# Patient Record
Sex: FEMALE | Race: WHITE | Employment: FULL TIME | URBAN - METROPOLITAN AREA
[De-identification: names, ages, dates, MRNs, and addresses within clinical notes are randomized per-mention and may not be internally consistent; named-entity substitution may affect disease eponyms.]

---

## 2019-04-02 ENCOUNTER — HOSPITAL ENCOUNTER (EMERGENCY)
Age: 49
Discharge: HOME | End: 2019-04-02

## 2019-04-02 VITALS
BODY MASS INDEX: 28.52 KG/M2 | WEIGHT: 155 LBS | HEART RATE: 85 BPM | TEMPERATURE: 97.5 F | DIASTOLIC BLOOD PRESSURE: 97 MMHG | RESPIRATION RATE: 22 BRPM | OXYGEN SATURATION: 100 % | SYSTOLIC BLOOD PRESSURE: 167 MMHG | HEIGHT: 62 IN

## 2019-04-02 DIAGNOSIS — K08.89 DENTALGIA: ICD-10-CM

## 2019-04-02 DIAGNOSIS — K02.9 DENTAL CARIES: Primary | ICD-10-CM

## 2019-04-02 DIAGNOSIS — K02.9 PAIN DUE TO DENTAL CARIES: ICD-10-CM

## 2019-04-02 PROCEDURE — 99283 EMERGENCY DEPT VISIT LOW MDM: CPT

## 2019-04-02 PROCEDURE — 6370000000 HC RX 637 (ALT 250 FOR IP): Performed by: PHYSICIAN ASSISTANT

## 2019-04-02 RX ORDER — IBUPROFEN 800 MG/1
800 TABLET ORAL EVERY 8 HOURS PRN
Qty: 20 TABLET | Refills: 0 | Status: SHIPPED | OUTPATIENT
Start: 2019-04-02 | End: 2019-04-04

## 2019-04-02 RX ORDER — PENICILLIN V POTASSIUM 250 MG/1
500 TABLET ORAL ONCE
Status: COMPLETED | OUTPATIENT
Start: 2019-04-02 | End: 2019-04-02

## 2019-04-02 RX ORDER — IBUPROFEN 800 MG/1
800 TABLET ORAL ONCE
Status: COMPLETED | OUTPATIENT
Start: 2019-04-02 | End: 2019-04-02

## 2019-04-02 RX ORDER — ACETAMINOPHEN 325 MG/1
650 TABLET ORAL EVERY 6 HOURS PRN
COMMUNITY

## 2019-04-02 RX ORDER — PENICILLIN V POTASSIUM 500 MG/1
500 TABLET ORAL 4 TIMES DAILY
Qty: 28 TABLET | Refills: 0 | Status: SHIPPED | OUTPATIENT
Start: 2019-04-02 | End: 2019-04-04

## 2019-04-02 RX ADMIN — IBUPROFEN 800 MG: 800 TABLET, FILM COATED ORAL at 13:44

## 2019-04-02 RX ADMIN — PENICILLIN V POTASSIUM 500 MG: 250 TABLET ORAL at 13:44

## 2019-04-02 ASSESSMENT — PAIN DESCRIPTION - LOCATION: LOCATION: TEETH

## 2019-04-02 ASSESSMENT — PAIN SCALES - GENERAL: PAINLEVEL_OUTOF10: 10

## 2019-04-02 NOTE — ED PROVIDER NOTES
Department of Emergency Medicine   ED  Provider Note  Admit Date/RoomTime: 4/2/2019  1:20 PM  ED Room: T1-3/   Chief Complaint   Dental Pain (left side bottom teeth pain for one week)    History of Present Illness   Source of history provided by:  patient. History/Exam Limitations: none. Maryellen Rosario is a 50 y.o. old female who has a past medical history of: History reviewed. No pertinent past medical history. presents to the emergency department by private vehicle, for left lower tooth and jaw pain, which occured 7 day(s) prior to arrival, worse today. Since onset the symptoms have been constant and gradually worsening and moderate to severe in severity. Worsened by  chewing and improved by nothing, heat and ice. Associated Signs & Symptoms:  no other symptoms and Facial pain. Onset:       Spontaneous:   yes. Following Trauma:   no.     Previous Caries:   yes. Recent Dental Procedure:   no.     ROS    Pertinent positives and negatives are stated within HPI, all other systems reviewed and are negative. .    Past Surgical History:  has a past surgical history that includes Tonsillectomy; Ankle surgery; and Tubal ligation. Social History:  reports that she quit smoking about 13 months ago. She has never used smokeless tobacco. She reports that she does not drink alcohol or use drugs. Family History: family history is not on file. Allergies: Aspirin; Bee venom; and Lactose intolerance (gi)    Physical Exam           ED Triage Vitals   BP Temp Temp Source Pulse Resp SpO2 Height Weight   04/02/19 1317 04/02/19 1316 04/02/19 1316 04/02/19 1317 04/02/19 1316 04/02/19 1318 04/02/19 1316 04/02/19 1316   (!) 167/97 97.5 °F (36.4 °C) Oral 85 22 100 % 5' 2\" (1.575 m) 155 lb (70.3 kg)      Oxygen Saturation Interpretation: Normal.    · Constitutional:  Alert, development consistent with age. · HEENT:  NC/NT. Airway patent. · Neck:  Supple. Normal ROM.   · Lips:  upper and lower worsening symptoms arise they should immediately return to the emergency room. Follow-up with primary care in 1-2 days. Assessment      1. Dental caries    2. Dentalgia    3. Pain due to dental caries      Plan   Discharge to home  Patient condition is stable    New Medications     New Prescriptions    IBUPROFEN (ADVIL;MOTRIN) 800 MG TABLET    Take 1 tablet by mouth every 8 hours as needed for Pain    MAGIC MOUTHWASH (MIRACLE MOUTHWASH)    Swish and spit 10 mLs 4 times daily as needed for Irritation Dispense as Magic Mouthwash. Please add Equal Parts: 60 mL Maalox, 60 mL Viscous Lidocaine, 60 mL Benadryl to 60mL of Carafate. 10 ml swish and spit or swallow as directed above. PENICILLIN V POTASSIUM (VEETID) 500 MG TABLET    Take 1 tablet by mouth 4 times daily for 7 days     [facility] signed by [facility]   DD: 4/2/19  **This report was transcribed using voice recognition software. Every effort was made to ensure accuracy; however, inadvertent computerized transcription errors may be present.   END OF ED PROVIDER NOTE      [facility]  04/02/19 8713

## 2019-04-04 ENCOUNTER — HOSPITAL ENCOUNTER (EMERGENCY)
Age: 49
Discharge: HOME | End: 2019-04-04

## 2019-04-04 VITALS
TEMPERATURE: 97.7 F | OXYGEN SATURATION: 99 % | RESPIRATION RATE: 18 BRPM | SYSTOLIC BLOOD PRESSURE: 156 MMHG | DIASTOLIC BLOOD PRESSURE: 99 MMHG | WEIGHT: 155 LBS | HEART RATE: 85 BPM | BODY MASS INDEX: 28.52 KG/M2 | HEIGHT: 62 IN

## 2019-04-04 DIAGNOSIS — K04.7 DENTAL ABSCESS: Primary | ICD-10-CM

## 2019-04-04 PROCEDURE — 4500000023 HC ED LEVEL 3 PROCEDURE

## 2019-04-04 PROCEDURE — 6370000000 HC RX 637 (ALT 250 FOR IP): Performed by: PHYSICIAN ASSISTANT

## 2019-04-04 PROCEDURE — 99283 EMERGENCY DEPT VISIT LOW MDM: CPT

## 2019-04-04 RX ORDER — CLINDAMYCIN HYDROCHLORIDE 150 MG/1
450 CAPSULE ORAL ONCE
Status: COMPLETED | OUTPATIENT
Start: 2019-04-04 | End: 2019-04-04

## 2019-04-04 RX ORDER — CLINDAMYCIN HYDROCHLORIDE 150 MG/1
450 CAPSULE ORAL 3 TIMES DAILY
Qty: 90 CAPSULE | Refills: 0 | Status: SHIPPED | OUTPATIENT
Start: 2019-04-04 | End: 2019-04-14

## 2019-04-04 RX ADMIN — LIDOCAINE HYDROCHLORIDE 15 ML: 20 SOLUTION ORAL; TOPICAL at 13:53

## 2019-04-04 RX ADMIN — CLINDAMYCIN HYDROCHLORIDE 450 MG: 150 CAPSULE ORAL at 13:52

## 2019-04-04 ASSESSMENT — PAIN DESCRIPTION - LOCATION: LOCATION: MOUTH

## 2019-04-04 ASSESSMENT — PAIN SCALES - GENERAL: PAINLEVEL_OUTOF10: 10

## 2019-04-04 ASSESSMENT — PAIN DESCRIPTION - ORIENTATION: ORIENTATION: LEFT

## 2019-04-04 ASSESSMENT — PAIN DESCRIPTION - PAIN TYPE: TYPE: ACUTE PAIN

## 2019-04-04 NOTE — ED PROVIDER NOTES
Department of Emergency Medicine   ED  Provider Note  Admit Date/RoomTime: 4/4/2019 12:51 PM  ED Room: 14/14   Chief Complaint   Oral Swelling (Was seen in ER 2 days ago and treated for abscessed tooth. Prescribed PCN and ibuprofen. States that the swelling is 2x the size that it was 2 days ago and pain is getting worse. )    History of Present Illness   Source of history provided by:  patient. History/Exam Limitations: none. Sarabjit Hutson is a 50 y.o. old female who has a past medical history of: History reviewed. No pertinent past medical history. presents to the emergency department by private vehicle, for left lower jaw pain, which occured 5 day(s) prior to arrival.  Since onset the symptoms have been constant and gradually worsening and moderate to severe in severity. Worsened by  chewing and improved by nothing. Associated Signs & Symptoms:  no other symptoms and Facial pain. Onset:       Spontaneous:   yes. Following Trauma:   no.     Previous Caries:   yes. Recent Dental Procedure:   no.     ROS    Pertinent positives and negatives are stated within HPI, all other systems reviewed and are negative. .    Past Surgical History:  has a past surgical history that includes Tonsillectomy; Ankle surgery; and Tubal ligation. Social History:  reports that she quit smoking about 14 months ago. She has never used smokeless tobacco. She reports that she does not drink alcohol or use drugs. Family History: family history is not on file. Allergies: Aspirin; Bee venom; and Lactose intolerance (gi)    Physical Exam           ED Triage Vitals [04/04/19 1252]   BP Temp Temp Source Pulse Resp SpO2 Height Weight   (!) 145/84 97.7 °F (36.5 °C) Oral 86 17 100 % 5' 2\" (1.575 m) 155 lb (70.3 kg)      Oxygen Saturation Interpretation: Normal.    · Constitutional:  Alert, development consistent with age. · HEENT:  NC/NT. Airway patent. · Neck:  Supple. Normal ROM.   · Lips:  upper and lower normal.  · Mouth:  normal tongue and buccal mucosa. · Dental:  Cavity and abscess formation to tooth #20                    Trismus: No.         Drooling: No.           Airway stridor: No.  · Facial skin: left warmth, tenderness and swelling. · Respiratory:  Clear to auscultation and breath sounds equal.    · CV: Regular rate and rhythm, normal heart sounds, without pathological murmurs, ectopy, gallops, or rubs. · Skin:  No rashes, erythema or lesions present, unless noted elsewhere. .  · Lymphatics: No lymphangitis or adenopathy noted. · Neurological:  Oriented. Motor functions intact. Lab / Imaging Results   (All laboratory and radiology results have been personally reviewed by myself)  Labs:  No results found for this visit on 04/04/19. Imaging: All Radiology results interpreted by Radiologist unless otherwise noted. No orders to display     ED Course / Medical Decision Making     Medications   clindamycin (CLEOCIN) capsule 450 mg (450 mg Oral Given 4/4/19 1352)   lidocaine viscous (XYLOCAINE) 2 % solution 15 mL (15 mLs Mouth/Throat Given 4/4/19 1353)          Procedure(s):   PROCEDURE:  DENTAL BLOCK  Mey Mitchell or their surrogate had an opportunity to ask questions, and the risks, benefits, and alternatives were discussed. The injection site was prepped to maintain a sterile field. A local anesthetic was used to completely anesthetize the inferior alveolar nerve. There were no complications during the procedure. [address] [place] [address] had excellent pain relief. PROCEDURE:  INCISION & DRAINAGE  Mey Mitchell or their surrogate had an opportunity to ask questions, and the risks, benefits, and alternatives were discussed. The abscess was prepped and draped to maintain a sterile field. A local anesthetic was used to completely anesthetize the abscess. A simple stab incision was made to keep the abscess open so it will continue to drain.  It was copiously irrigated with its loculations broken down. There were no complications during the procedure. Counseling/MDM:   Febrile stable patient presents to the ED for evaluation was seen 2 days prior to arrival and placed on penicillin. Has had worsening swelling and abscess formation 2 areas cavity. No difficulty with swallowing low suspicion for 1 week's no elevation of the floor the mouth. Patient indicates she's been compliant with taking penicillin however the pain and swelling is worse. Discussed a dental block and incision and drainage patient is agreeable she is consented and has excellent pain relief with a dental block and then simple stab incision produces copious amounts of purulent drainage on reevaluation patient is feeling markedly improved she is advised we will switch her antibiotic to clindamycin she is advised to stop taking penicillin and to begin taking clindamycin importance of follow-up with the dentist is emphasized patient does verbalize understanding she'll be discharged in stable condition. The emergency provider has spoken with the patient and discussed todays results, in addition to providing specific details for the plan of care and counseling regarding the diagnosis and prognosis. Questions are answered at this time and they are agreeable with the plan. I estimate there is LOW risk for a ANAPHYLAXIS, DEEP SPACE INFECTION (e.g., GORDYS ANGINA OR RETROPHARYNGEAL ABSCESS), EPIGLOTTITIS, MENINGITIS, or AIRWAY COMPROMISE, thus I consider the discharge disposition reasonable. Also, there is no evidence or peritonitis, sepsis, or toxicity. [address] Tah[address] Malone and I have discussed the diagnosis and risks, and we agree with discharging home to follow-up with their primary doctor. We also discussed returning to the Emergency Department immediately if new or worsening symptoms occur.  We have discussed the symptoms which are most concerning (e.g., changing or worsening pain, trouble swallowing or breathing, neck stiffness or fever) that necessitate immediate return. Assessment      1. Dental abscess      Plan   Discharge to home  Patient condition is stable    New Medications     New Prescriptions    CLINDAMYCIN (CLEOCIN) 150 MG CAPSULE    Take 3 capsules by mouth 3 times daily for 10 days     Electronically signed by Morena Brennan PA-C   DD: 4/4/19  **This report was transcribed using voice recognition software. Every effort was made to ensure accuracy; however, inadvertent computerized transcription errors may be present.   END OF ED PROVIDER NOTE      Morena Brennan PA-C  04/04/19 [id number]

## 2020-07-02 ENCOUNTER — TELEPHONE (OUTPATIENT)
Dept: INTERNAL MEDICINE CLINIC | Age: 50
End: 2020-07-02

## 2020-07-03 ENCOUNTER — APPOINTMENT (OUTPATIENT)
Dept: CT IMAGING | Age: 50
End: 2020-07-03
Payer: MEDICAID

## 2020-07-03 ENCOUNTER — HOSPITAL ENCOUNTER (EMERGENCY)
Age: 50
Discharge: HOME | End: 2020-07-03
Payer: MEDICAID

## 2020-07-03 VITALS
BODY MASS INDEX: 27.6 KG/M2 | HEART RATE: 81 BPM | RESPIRATION RATE: 16 BRPM | SYSTOLIC BLOOD PRESSURE: 132 MMHG | DIASTOLIC BLOOD PRESSURE: 87 MMHG | HEIGHT: 62 IN | TEMPERATURE: 98.7 F | WEIGHT: 150 LBS | OXYGEN SATURATION: 97 %

## 2020-07-03 LAB
HCG(URINE) PREGNANCY TEST: NEGATIVE
S PYO AG THROAT QL: NEGATIVE

## 2020-07-03 PROCEDURE — 6370000000 HC RX 637 (ALT 250 FOR IP): Performed by: NURSE PRACTITIONER

## 2020-07-03 PROCEDURE — 87081 CULTURE SCREEN ONLY: CPT

## 2020-07-03 PROCEDURE — 99283 EMERGENCY DEPT VISIT LOW MDM: CPT

## 2020-07-03 PROCEDURE — 84703 CHORIONIC GONADOTROPIN ASSAY: CPT

## 2020-07-03 PROCEDURE — 87880 STREP A ASSAY W/OPTIC: CPT

## 2020-07-03 PROCEDURE — 70490 CT SOFT TISSUE NECK W/O DYE: CPT

## 2020-07-03 RX ORDER — TRAMADOL HYDROCHLORIDE 50 MG/1
50 TABLET ORAL ONCE
Status: COMPLETED | OUTPATIENT
Start: 2020-07-03 | End: 2020-07-03

## 2020-07-03 RX ORDER — TRAMADOL HYDROCHLORIDE 50 MG/1
50 TABLET ORAL EVERY 6 HOURS PRN
Qty: 12 TABLET | Refills: 0 | Status: SHIPPED | OUTPATIENT
Start: 2020-07-03 | End: 2020-07-06

## 2020-07-03 RX ADMIN — TRAMADOL HYDROCHLORIDE 50 MG: 50 TABLET, FILM COATED ORAL at 12:56

## 2020-07-03 ASSESSMENT — PAIN SCALES - GENERAL
PAINLEVEL_OUTOF10: 8
PAINLEVEL_OUTOF10: 3
PAINLEVEL_OUTOF10: 8

## 2020-07-03 NOTE — ED PROVIDER NOTES
CHIEF COMPLAINT  Otalgia (right ear x1 month causing pain to shoot up head causing it to ache)      HISTORY OF PRESENT ILLNESS  Gerhardt Silvers is a 52 y.o. female who presents to the ED complaining of sore throat and otalgia. Patient is nontoxic in appearance and in no acute distress. Patient presents to the emergency department via private vehicle. Patient reports that she has been experiencing symptoms for the past 1 month or so, states that she primarily has discomfort in her throat which then radiates into her right ear. Patient states that at times she also experiences a headache associated with this although she does deny this at this time. Reports 8 out of 10 aching and throbbing discomfort. Denies exacerbating factors. Reports does take a Tylenol, which she states does help alleviate her discomfort mildly. Denies any associated visual disturbances, lightheadedness or dizziness. No chest pain or shortness of breath or difficulty breathing. No abdominal pain, nausea or vomiting or bowel bladder complaints. She denies any associated fever or chills. No injury or trauma reported. No other complaints, modifying factors or associated symptoms. Nursing notes reviewed. History reviewed. No pertinent past medical history. Past Surgical History:   Procedure Laterality Date    ANKLE SURGERY      TONSILLECTOMY      TUBAL LIGATION       History reviewed. No pertinent family history.   Social History     Socioeconomic History    Marital status:      Spouse name: Not on file    Number of children: Not on file    Years of education: Not on file    Highest education level: Not on file   Occupational History    Not on file   Social Needs    Financial resource strain: Not on file    Food insecurity     Worry: Not on file     Inability: Not on file    Transportation needs     Medical: Not on file     Non-medical: Not on file   Tobacco Use    Smoking status: Former Smoker Last attempt to quit: 2018     Years since quittin.4    Smokeless tobacco: Never Used    Tobacco comment: 2018   Substance and Sexual Activity    Alcohol use: Yes     Comment: rarely    Drug use: No    Sexual activity: Not on file   Lifestyle    Physical activity     Days per week: Not on file     Minutes per session: Not on file    Stress: Not on file   Relationships    Social connections     Talks on phone: Not on file     Gets together: Not on file     Attends Mandaen service: Not on file     Active member of club or organization: Not on file     Attends meetings of clubs or organizations: Not on file     Relationship status: Not on file    Intimate partner violence     Fear of current or ex partner: Not on file     Emotionally abused: Not on file     Physically abused: Not on file     Forced sexual activity: Not on file   Other Topics Concern    Not on file   Social History Narrative    Not on file     No current facility-administered medications for this encounter. Current Outpatient Medications   Medication Sig Dispense Refill    traMADol (ULTRAM) 50 MG tablet Take 1 tablet by mouth every 6 hours as needed for Pain for up to 3 days. 12 tablet 0    acetaminophen (TYLENOL) 325 MG tablet Take 650 mg by mouth every 6 hours as needed for Pain      Magic Mouthwash (MIRACLE MOUTHWASH) Swish and spit 10 mLs 4 times daily as needed for Irritation Dispense as Magic Mouthwash. Please add Equal Parts: 60 mL Maalox, 60 mL Viscous Lidocaine, 60 mL Benadryl to 60mL of Carafate. 10 ml swish and spit or swallow as directed above.  240 mL 0     Allergies   Allergen Reactions    Aspirin Swelling    Bee Venom Swelling    Lactose Intolerance (Gi)        REVIEW OF SYSTEMS  10 systems reviewed, pertinent positives per HPI otherwise noted to be negative    PHYSICAL EXAM  /87   Pulse 81   Temp 98.7 °F (37.1 °C) (Oral)   Resp 16   Ht 5' 2\" (1.575 m)   Wt 150 lb (68 kg)   LMP 2020 SpO2 97%   BMI 27.44 kg/m²   GENERAL APPEARANCE: Awake and alert. Cooperative. No acute distress. HEAD: Normocephalic. Atraumatic. EYES: EOM's grossly intact. Bilateral conjunctiva clear and without exudate. No conjunctival injection bilaterally. No scleral icterus. ENT: Mucous membranes are moist. Bilateral tympanic membranes are clear. Posterior oropharynx is with mild erythema and without exudate. Uvula is midline. No tenderness with palpation to the mastoid or tragus. No muffled speech. NECK: Supple. Normal ROM. Trachea is midline. Patient notable for bilateral anterior cervical adenopathy, right slightly greater than left. She does exhibit more tenderness with palpation to the right. No meningismus. CHEST: Equal and symmetric chest rise and fall. HEART: Regular rate and rhythm without murmurs. LUNGS: Respirations unlabored. Speaking comfortably in full sentences. ABDOMEN: Nondistended. EXTREMITIES: Moves all extremities equally and neurovascularly intact. No edema. SKIN: Pink ,warm and dry. No acute rashes. NEUROLOGICAL: Alert and oriented x 4. Speech clear. No gross facial drooping. Strength is 5/5 in all extremities and sensation intact. Gait normal.   PSYCHIATRIC: Normal mood and affect. RADIOLOGY  Ct Soft Tissue Neck Wo Contrast    Result Date: 7/3/2020  EXAMINATION: CT OF THE NECK WITHOUT CONTRAST  7/3/2020 TECHNIQUE: CT of the neck was performed without the administration of intravenous contrast. Multiplanar reformatted images are provided for review. Dose modulation, iterative reconstruction, and/or weight based adjustment of the mA/kV was utilized to reduce the radiation dose to as low as reasonably achievable. COMPARISON: None.  HISTORY: ORDERING SYSTEM PROVIDED HISTORY: sore throat right > left TECHNOLOGIST PROVIDED HISTORY: Reason for exam:->sore throat right > left Is the patient pregnant?->No Reason for Exam: sore throat right > left  x1 month Acuity: Acute Type of up with ENT in 2-3 days for further evaluation/treatment. Patient will be discharged home with a prescription for the following medications below. Patient verbalizes understanding, all questions were answered and she is agreeable with the plan of care. Patient will return to ED for any new/worsening symptoms. Patient was given scripts for the following medications. I counseled patient how to take these medications. Discharge Medication List as of 7/3/2020  2:07 PM      START taking these medications    Details   traMADol (ULTRAM) 50 MG tablet Take 1 tablet by mouth every 6 hours as needed for Pain for up to 3 days. , Disp-12 tablet, R-0Print           MDM  Results for orders placed or performed during the hospital encounter of 07/03/20   Strep Screen Group A Throat   Result Value Ref Range    Rapid Strep A Screen Negative Negative   Pregnancy, Urine   Result Value Ref Range    HCG(Urine) Pregnancy Test Negative Detects HCG level >20 MIU/mL         I estimate there is LOW risk for a ANAPHYLAXIS, DEEP SPACE INFECTION (e.g., GORDYS ANGINA OR RETROPHARYNGEAL ABSCESS), EPIGLOTTITIS, MENINGITIS, or AIRWAY COMPROMISE, thus I consider the discharge disposition reasonable. Also, there is no evidence or peritonitis, sepsis, or toxicity. [address] Tahir Malone and I have discussed the diagnosis and risks, and we agree with discharging home to follow-up with their primary doctor. We also discussed returning to the Emergency Department immediately if new or worsening symptoms occur. We have discussed the symptoms which are most concerning (e.g., changing or worsening pain, trouble swallowing or breathing, neck stiffness or fever) that necessitate immediate return. Final Impression    1. Otalgia of right ear    2. Acute pharyngitis, unspecified etiology        Discharge Vital Signs:  Blood pressure 132/87, pulse 81, temperature 98.7 °F (37.1 °C), temperature source Oral, resp.  rate 16, height 5' 2\" (1.575 m), weight 150 lb (68 kg), last menstrual period 06/17/2020, SpO2 97 %, not currently breastfeeding. DISPOSITION  Patient was discharged to home in stable condition. DISCLAIMER:  Please note this report has been produced using speech recognition Dragon software and may contain errors related to that system including errors in grammar, punctuation, and spelling, as well as words and phrases that may be inappropriate. If there are any questions or concerns please feel free to contact the dictating provider for clarification.                 Javon Bucio, NIDHI - [id number] [facility]  07/03/20 0617

## 2020-07-03 NOTE — ED NOTES
Pt scripts x1 instructed to follow up with [facility]. Assessed per [facility] NP.      Alexey Waldron LPN  24/37/35 0226

## 2020-07-05 LAB — S PYO THROAT QL CULT: NORMAL

## 2020-09-09 ENCOUNTER — OFFICE VISIT (OUTPATIENT)
Dept: INTERNAL MEDICINE CLINIC | Age: 50
End: 2020-09-09
Payer: MEDICAID

## 2020-09-09 VITALS
WEIGHT: 193 LBS | OXYGEN SATURATION: 95 % | SYSTOLIC BLOOD PRESSURE: 124 MMHG | HEART RATE: 65 BPM | HEIGHT: 63 IN | TEMPERATURE: 97.6 F | BODY MASS INDEX: 34.2 KG/M2 | DIASTOLIC BLOOD PRESSURE: 62 MMHG

## 2020-09-09 PROBLEM — M25.532 CHRONIC WRIST PAIN, LEFT: Status: ACTIVE | Noted: 2020-09-09

## 2020-09-09 PROBLEM — N39.46 MIXED STRESS AND URGE URINARY INCONTINENCE: Status: ACTIVE | Noted: 2020-09-09

## 2020-09-09 PROBLEM — G89.29 CHRONIC WRIST PAIN, LEFT: Status: ACTIVE | Noted: 2020-09-09

## 2020-09-09 PROCEDURE — 99386 PREV VISIT NEW AGE 40-64: CPT | Performed by: INTERNAL MEDICINE

## 2020-09-09 PROCEDURE — 90471 IMMUNIZATION ADMIN: CPT | Performed by: INTERNAL MEDICINE

## 2020-09-09 PROCEDURE — 90715 TDAP VACCINE 7 YRS/> IM: CPT | Performed by: INTERNAL MEDICINE

## 2020-09-09 ASSESSMENT — PATIENT HEALTH QUESTIONNAIRE - PHQ9
1. LITTLE INTEREST OR PLEASURE IN DOING THINGS: 0
SUM OF ALL RESPONSES TO PHQ QUESTIONS 1-9: 0
SUM OF ALL RESPONSES TO PHQ QUESTIONS 1-9: 0
SUM OF ALL RESPONSES TO PHQ9 QUESTIONS 1 & 2: 0
2. FEELING DOWN, DEPRESSED OR HOPELESS: 0

## 2020-09-09 NOTE — PROGRESS NOTES
[facility]  History and Physical  Jenna Wallace M.D.        César Rodriguez  YOB: 1970    Date of Service:  9/9/2020    Chief Complaint:   César Rodriguez is a 52 y.o. female who presents for   Chief Complaint   Patient presents with    [facility]       HPI: Here for Annual Physical and Follow up. She complain of pain along wrist ulnar aspect since 2017 when she had to carry  pound label rolls and never went away. She complain of urine leakage with coughing and wears a Depends. Have not tried Kegel exercise yet. Lab Results   Component Value Date    LABMICR Yes 11/28/2015     Lab Results   Component Value Date     (L) 11/27/2015    K 3.8 11/27/2015     11/27/2015    CO2 27 11/27/2015    BUN 7 11/27/2015    CREATININE 0.8 11/27/2015    GLUCOSE 134 (H) 11/27/2015    CALCIUM 9.4 11/27/2015     No results found for: CHOL, TRIG, HDL, LDLCALC, LDLDIRECT  No results found for: ALT, AST  No results found for: TSH, T4FREE  Lab Results   Component Value Date    WBC 12.2 (H) 11/27/2015    HGB 13.3 11/27/2015    HCT 40.8 11/27/2015    MCV 93.0 11/27/2015     11/27/2015     Lab Results   Component Value Date    INR 0.96 11/27/2015      No results found for: PSA   No results found for: LABURIC     Wt Readings from Last 3 Encounters:   09/09/20 193 lb (87.5 kg)   07/03/20 150 lb (68 kg)   04/04/19 155 lb (70.3 kg)     BP Readings from Last 3 Encounters:   09/09/20 124/62   07/03/20 132/87   04/04/19 (!) 156/99       There is no problem list on file for this patient.       Allergies   Allergen Reactions    Aspirin Swelling    Bee Venom Swelling    Lactose Intolerance (Gi)      Outpatient Medications Marked as Taking for the 9/9/20 encounter (Office Visit) with Miladis Amaya MD   Medication Sig Dispense Refill    acetaminophen (TYLENOL) 325 MG tablet Take 650 mg by mouth every 6 hours as needed for Pain         Past Medical History:   Diagnosis Date    Dental abscess      Past Surgical History:   Procedure Laterality Date    ANKLE SURGERY Left 2008    shattered ankle from a fall on ice    TONSILLECTOMY      TUBAL LIGATION  2001     Family History   Problem Relation Age of Onset    Heart Attack Mother     High Cholesterol Mother     Early Death Mother 55    Cancer Father         pancreatic     High Cholesterol Father     Cancer Maternal Grandmother         uterine      Social History     Socioeconomic History    Marital status:      Spouse name: Not on file    Number of children: Not on file    Years of education: Not on file    Highest education level: Not on file   Occupational History    Not on file   Social Needs    Financial resource strain: Not on file    Food insecurity     Worry: Not on file     Inability: Not on file    Transportation needs     Medical: Not on file     Non-medical: Not on file   Tobacco Use    Smoking status: Former Smoker     Last attempt to quit: 2018     Years since quittin.6    Smokeless tobacco: Never Used    Tobacco comment: 2018   Substance and Sexual Activity    Alcohol use: Yes     Comment: rarely    Drug use: No    Sexual activity: Yes   Lifestyle    Physical activity     Days per week: Not on file     Minutes per session: Not on file    Stress: Not on file   Relationships    Social connections     Talks on phone: Not on file     Gets together: Not on file     Attends Anglican service: Not on file     Active member of club or organization: Not on file     Attends meetings of clubs or organizations: Not on file     Relationship status: Not on file    Intimate partner violence     Fear of current or ex partner: Not on file     Emotionally abused: Not on file     Physically abused: Not on file     Forced sexual activity: Not on file   Other Topics Concern    Not on file   Social History Narrative    Not on file       Review of Systems:  A comprehensive review of systems was negative except for what was noted in the HPI. Physical Exam:   Vitals:    09/09/20 0952   BP: 124/62   Pulse: 65   Temp: 97.6 °F (36.4 °C)   TempSrc: Infrared   SpO2: 95%   Weight: 193 lb (87.5 kg)   Height: 5' 3\" (1.6 m)     Body mass index is 34.19 kg/m². Constitutional: She is oriented to person, place, and time. She appears well-developed and well-nourished. No distress. HEENT:   Head: Normocephalic and atraumatic. Right Ear: Tympanic membrane, external ear and ear canal normal.   Left Ear: Tympanic membrane, external ear and ear canal normal.   Mouth/Throat: Oropharynx is clear and moist, and mucous membranes are normal.  There is no cervical adenopathy. Eyes: Conjunctivae and extraocular motions are normal. Pupils are equal, round, and reactive to light. Neck: Supple. No JVD present. Carotid bruit is not present. No mass and no thyromegaly present. Cardiovascular: Normal rate, regular rhythm, normal heart sounds and intact distal pulses. Exam reveals no gallop and no friction rub. No murmur heard. Pulmonary/Chest: Effort normal and breath sounds normal. No respiratory distress. She has no wheezes, rhonchi or rales. Abdominal: Soft, non-tender. Bowel sounds and aorta are normal. She exhibits no organomegaly, mass or bruit. Musculoskeletal: Normal range of motion, no synovitis. She exhibits no edema. Neurological: She is alert and oriented to person, place, and time. She has normal reflexes. No cranial nerve deficit. Coordination normal.   Skin: Skin is warm and dry. There is no rash or erythema. No suspicious lesions noted. Psychiatric: She has a normal mood and affect.  Her speech is normal and behavior is normal. Judgment, cognition and memory are normal.       Preventive Care:  Health Maintenance   Topic Date Due    HIV screen  10/31/1985    DTaP/Tdap/Td vaccine (1 - Tdap) 10/31/1989    Cervical cancer screen  10/31/1991    Lipid screen  10/31/2010    Diabetes screen  10/31/2010    Flu vaccine (1) 09/01/2020    Hepatitis A vaccine  Aged Out    Hepatitis B vaccine  Aged Out    Hib vaccine  Aged Out    Meningococcal (ACWY) vaccine  Aged Out    Pneumococcal 0-64 years Vaccine  Aged Out      Hx abnormal PAP: no  Sexual activity: has sex with males   Last eye exam: 2019, normal  Exercise: walks 5 time(s) per week       Preventive plan of care for Brittni Brochure        9/9/2020           Preventive Measures Status       Recommendations for screening                   Diabetes Screen  Glucose (mg/dL)   Date Value   11/27/2015 134 (H)    Test recommended and ordered   Cholesterol Screen  No results found for: CHOL, TRIG, HDL, LDLCALC, LDLDIRECT Test recommended and ordered       Weight: Body mass index is 34.19 kg/m². 5' 3\" (1.6 m)193 lb (87.5 kg)    Your BMI is 25 or greater, which indicates that you are overweight        Recommended Immunizations      There is no immunization history on file for this patient. Influenza vaccine:  recommended every fall  Tetanus vaccine:  tetanus and diptheria vaccine (Td) administered today- risks and benefits discussed         Other Recommendations ·   See a dentist every 6 months  · Try to get at least 30 minutes of exercise 3-5 days per week  · Always wear a seat belt when traveling in a car  · Always wear a helmet when riding a bicycle or motorcycle  · When exposed to the sun, use a sunscreen that protects against both UVA and UVB radiation with an SPF of 30 or greater- reapply every 2 to 3 hours or after sweating, drying off with a towel, or swimming  · You need 4965-1196 mg of calcium and 4943-3401 IU of vitamin D per day- it is possible to meet your calcium requirement with diet alone, but a vitamin D supplement is usually necessary                 Assessment/Plan:    Saúl Frazier was seen today for establish care. Diagnoses and all orders for this visit:    Annual physical exam  -     Lipid Panel;  Future  -     Comprehensive Metabolic Panel; Future  -     CBC with Differential; Future  -     Hemoglobin A1C; Future    Need for tetanus, diphtheria, and acellular pertussis (Tdap) vaccine in patient of adolescent age or older  -     Tdap (age 6y and older) IM ([address])    Wrist pain, chronic, left  -     [address]  Start diclofenac sodium (VOLTAREN) 1 % GEL;  Apply 2 g topically 4 times daily    Mixed stress and urge urinary incontinence  Pt decline med at this time          Return 9/9 at 9:50 (20 mins) Fasting Physical.

## 2020-12-05 ENCOUNTER — HOSPITAL ENCOUNTER (EMERGENCY)
Age: 50
Discharge: HOME | End: 2020-12-05
Payer: MEDICAID

## 2020-12-05 VITALS
HEIGHT: 63 IN | HEART RATE: 59 BPM | SYSTOLIC BLOOD PRESSURE: 128 MMHG | RESPIRATION RATE: 16 BRPM | DIASTOLIC BLOOD PRESSURE: 80 MMHG | OXYGEN SATURATION: 97 % | TEMPERATURE: 99.3 F | BODY MASS INDEX: 28.35 KG/M2 | WEIGHT: 160 LBS

## 2020-12-05 PROCEDURE — 6370000000 HC RX 637 (ALT 250 FOR IP): Performed by: NURSE PRACTITIONER

## 2020-12-05 PROCEDURE — 99283 EMERGENCY DEPT VISIT LOW MDM: CPT

## 2020-12-05 RX ORDER — PENICILLIN V POTASSIUM 250 MG/1
500 TABLET ORAL ONCE
Status: COMPLETED | OUTPATIENT
Start: 2020-12-05 | End: 2020-12-05

## 2020-12-05 RX ORDER — PENICILLIN V POTASSIUM 500 MG/1
500 TABLET ORAL 4 TIMES DAILY
Qty: 28 TABLET | Refills: 0 | Status: SHIPPED | OUTPATIENT
Start: 2020-12-05 | End: 2020-12-12

## 2020-12-05 RX ADMIN — PENICILLIN V POTASIUM 500 MG: 250 TABLET ORAL at 14:50

## 2020-12-05 ASSESSMENT — PAIN DESCRIPTION - LOCATION: LOCATION: HEAD

## 2020-12-05 ASSESSMENT — PAIN SCALES - GENERAL: PAINLEVEL_OUTOF10: 9

## 2020-12-05 ASSESSMENT — PAIN DESCRIPTION - PAIN TYPE: TYPE: ACUTE PAIN

## 2020-12-05 NOTE — ED PROVIDER NOTES
[facility] Emergency Department    CHIEF COMPLAINT  Dental Pain (Pain to lower wisdom tooth on right side and to inner lining of left lower cheek. States she does not have a dentist d/t insurance problems. )      SHARED SERVICE VISIT:  Evaluated by CELESTINA. My supervising physician was available for consultation. HISTORY OF PRESENT ILLNESS  Jaylan Brooks is a 48 y.o. female with overall poor dentition who presents to the ED complaining of \"aching\" 9/10 dental pain associated with dental caries on tooth #20 and #32. Denies otalgia, fever, chills, sweats, difficulty swallowing, facial swelling, sore throat, or other concerns. Reports that she is having difficulty finding a dentist who accepts her listedplaces [facility] dental insurance. Denies recent antibiotic use. No other complaints, modifying factors or associated symptoms. Nursing notes reviewed.    Past Medical History:   Diagnosis Date    Dental abscess      Past Surgical History:   Procedure Laterality Date    ANKLE SURGERY Left 2008    shattered ankle from a fall on ice    TONSILLECTOMY      TUBAL LIGATION  2001     Family History   Problem Relation Age of Onset    Heart Attack Mother 55    High Cholesterol Mother     Early Death Mother 55    Heart Disease Mother         CABG    High Cholesterol Father     Pancreatic Cancer Father     Cancer Maternal Grandmother         uterine      Social History     Socioeconomic History    Marital status:      Spouse name: Not on file    Number of children: Not on file    Years of education: Not on file    Highest education level: Not on file   Occupational History    Not on file   Social Needs    Financial resource strain: Not on file    Food insecurity     Worry: Not on file     Inability: Not on file    Transportation needs     Medical: Not on file     Non-medical: Not on file   Tobacco Use    Smoking status: Former Smoker     Last attempt to quit: 02/2018 Years since quittin.8    Smokeless tobacco: Never Used    Tobacco comment: 2018   Substance and Sexual Activity    Alcohol use: Yes     Comment: rarely    Drug use: No    Sexual activity: Yes   Lifestyle    Physical activity     Days per week: Not on file     Minutes per session: Not on file    Stress: Not on file   Relationships    Social connections     Talks on phone: Not on file     Gets together: Not on file     Attends Baptism service: Not on file     Active member of club or organization: Not on file     Attends meetings of clubs or organizations: Not on file     Relationship status: Not on file    Intimate partner violence     Fear of current or ex partner: Not on file     Emotionally abused: Not on file     Physically abused: Not on file     Forced sexual activity: Not on file   Other Topics Concern    Not on file   Social History Narrative    Not on file     No current facility-administered medications for this encounter. Current Outpatient Medications   Medication Sig Dispense Refill    acetaminophen (TYLENOL) 325 MG tablet Take 650 mg by mouth every 6 hours as needed for Pain       Allergies   Allergen Reactions    Aspirin Swelling    Bee Venom Swelling    Lactose Intolerance (Gi)        REVIEW OF SYSTEMS  6 systems reviewed, pertinent positives per HPI otherwise noted to be negative    PHYSICAL EXAM  /80   Pulse 59   Temp 99.3 °F (37.4 °C) (Oral)   Resp 16   Ht 5' 2.5\" (1.588 m)   Wt 160 lb (72.6 kg)   SpO2 97%   BMI 28.80 kg/m²   GENERAL APPEARANCE: Awake and alert. Cooperative. No acute distress. HEAD: Normocephalic. Atraumatic. EYES: PERRL. EOM's grossly intact. ENT: Mucous membranes are moist.  Uvula is not. There is no erythema or tonsillar exudates. Oral: + Poor dentition. There is no visible/palpable drainable periapical abscess upon inspection or palpation.  + Large dental caries noted to tooth #20 and 32. [place] NECK: Supple. Normal ROM.   No cervical adenopathy. CHEST: Equal symmetric chest rise. LUNGS: Breathing is unlabored. Speaking comfortably in full sentences. Abdomen: Nondistended  EXTREMITIES: MAEE. No acute deformities. SKIN: Warm and dry. NEUROLOGICAL: Alert and oriented. Strength is 5/5 in all extremities and sensation is intact. RADIOLOGY  No results found. ED COURSE  Patient did not require analgesia while in the emergency department as she has taken ibuprofen and Tylenol PTA. A discussion was had with Mrs. Mitchell regarding dental pain, dental caries, and intention to discharge. Risk management discussed and shared decision making had with patient and/or surrogate. All questions were answered. Patient will follow up with her PCP and a dentist-referred for further evaluation/treatment. Patient will return to ED for new/worsening symptoms. Patient was sent home with a prescription for penicillin VK. MDM  Patient presents to emergency department with dental pain from dental caries. Considered dental abscess but less likely as no visible or palpable periapical abscess and no facial swelling. Considered TMJ syndrome but less likely as no jaw pain, no crepitus upon opening and closing of mandible, and no audible clicking. I feel the patient is safe and appropriate for discharge to home as there is low suspicion for dental abscess, TMJ syndrome, or other acute processes. The patient will continue OTC analgesics for pain and will be prescribed penicillin 500 mg 3 times daily x7 days for the treatment of dental infection/pain. She was given the first dose of penicillin here. Dental clinic list provided and patient is instructed to call to determine which clinic except her insurance. She will return to the emergency department for new or worsening symptoms including increased pain, difficulty swallowing, inability to eat or drink, shortness of breath, fever, chills, sweats, or other concerns.   Patient lives with her

## 2021-01-07 VITALS
TEMPERATURE: 97.3 F | HEIGHT: 63 IN | HEART RATE: 60 BPM | SYSTOLIC BLOOD PRESSURE: 139 MMHG | OXYGEN SATURATION: 98 % | DIASTOLIC BLOOD PRESSURE: 87 MMHG | RESPIRATION RATE: 20 BRPM | WEIGHT: 165 LBS | BODY MASS INDEX: 29.23 KG/M2

## 2021-01-07 PROCEDURE — 99283 EMERGENCY DEPT VISIT LOW MDM: CPT | Performed by: PHYSICIAN ASSISTANT

## 2021-01-08 ENCOUNTER — HOSPITAL ENCOUNTER (EMERGENCY)
Age: 51
Discharge: HOME | End: 2021-01-08
Payer: MEDICAID

## 2021-01-08 DIAGNOSIS — H92.03 OTALGIA OF BOTH EARS: ICD-10-CM

## 2021-01-08 DIAGNOSIS — K04.7 DENTAL INFECTION: Primary | ICD-10-CM

## 2021-01-08 DIAGNOSIS — K05.10 GINGIVITIS: ICD-10-CM

## 2021-01-08 PROCEDURE — 6370000000 HC RX 637 (ALT 250 FOR IP): Performed by: PHYSICIAN ASSISTANT

## 2021-01-08 RX ORDER — CLINDAMYCIN HYDROCHLORIDE 150 MG/1
300 CAPSULE ORAL ONCE
Status: COMPLETED | OUTPATIENT
Start: 2021-01-08 | End: 2021-01-08

## 2021-01-08 RX ORDER — CHLORHEXIDINE GLUCONATE 0.12 MG/ML
RINSE ORAL
Qty: 420 ML | Refills: 0 | Status: SHIPPED | OUTPATIENT
Start: 2021-01-08 | End: 2021-01-16

## 2021-01-08 RX ORDER — ACETAMINOPHEN 500 MG
1000 TABLET ORAL ONCE
Status: COMPLETED | OUTPATIENT
Start: 2021-01-08 | End: 2021-01-08

## 2021-01-08 RX ORDER — HYDROCODONE BITARTRATE AND ACETAMINOPHEN 5; 325 MG/1; MG/1
1 TABLET ORAL EVERY 6 HOURS PRN
Qty: 7 TABLET | Refills: 0 | Status: SHIPPED | OUTPATIENT
Start: 2021-01-08 | End: 2021-01-11

## 2021-01-08 RX ORDER — CLINDAMYCIN HYDROCHLORIDE 300 MG/1
300 CAPSULE ORAL 3 TIMES DAILY
Qty: 21 CAPSULE | Refills: 0 | Status: SHIPPED | OUTPATIENT
Start: 2021-01-08 | End: 2021-01-15

## 2021-01-08 RX ADMIN — CLINDAMYCIN HYDROCHLORIDE 300 MG: 150 CAPSULE ORAL at 01:11

## 2021-01-08 RX ADMIN — ACETAMINOPHEN 1000 MG: 500 TABLET ORAL at 01:10

## 2021-01-08 ASSESSMENT — PAIN SCALES - GENERAL: PAINLEVEL_OUTOF10: 9

## 2021-01-08 NOTE — ED PROVIDER NOTES
[address] [facility]  ED  EMERGENCY DEPARTMENT ENCOUNTER        Pt Name: Radha Reddy  MRN: [id number]  [place] 1970  Date of evaluation: 1/7/2021  Provider: Abbe Johnson PA-C  PCP: Oz Lu MD    CELESTINA. I have evaluated this patient. My supervising physician was available for consultation. Hui Cosby      CHIEF COMPLAINT       Chief Complaint   Patient presents with    Otalgia     Patient comes into ED with c/o right ear pain x3 weeks. States she was seen here last month for tooth abscess. HISTORY OF PRESENT ILLNESS   (Location, Timing/Onset, Context/Setting, Quality, Duration, Modifying Factors, Severity, Associated Signs and Symptoms)  Note limiting factors. Radha Reddy is a 48 y.o. female patient resenting with all infection and states seen here December. Unable to see dentist thus far. She was given penicillin. She has been taking Tylenol last dose 650 mg at 7 PM.  Current time 1 AM.  I will redose at 1000 mg. Patient does have bad breath and poor dentition. She indicates pain referred into the ears. She indicates mild headache. Patient primary complaint is oral infection. Nursing Notes were all reviewed and agreed with or any disagreements were addressed in the HPI. REVIEW OF SYSTEMS    (2-9 systems for level 4, 10 or more for level 5)     Review of Systems    Positives and Pertinent negatives as per HPI. Except as noted above in the ROS, all other systems were reviewed and negative.        PAST MEDICAL HISTORY     Past Medical History:   Diagnosis Date    Dental abscess          SURGICAL HISTORY     Past Surgical History:   Procedure Laterality Date    ANKLE SURGERY Left 2008    shattered ankle from a fall on ice    TONSILLECTOMY      TUBAL LIGATION  2001         [address]       Discharge Medication List as of 1/8/2021  1:04 AM      CONTINUE these medications which have NOT CHANGED    Details   acetaminophen (TYLENOL) 325 MG tablet Take 650 mg by mouth every 6 hours as needed for PainHistorical Med               ALLERGIES     Aspirin, Bee venom, and Lactose intolerance (gi)    FAMILYHISTORY       Family History   Problem Relation Age of Onset    Heart Attack Mother 55    High Cholesterol Mother     Early Death Mother 55    Heart Disease Mother         CABG    High Cholesterol Father     Pancreatic Cancer Father     Cancer Maternal Grandmother         uterine           SOCIAL HISTORY       Social History     Tobacco Use    Smoking status: Former Smoker     Quit date: 2018     Years since quittin.9    Smokeless tobacco: Never Used    Tobacco comment: 2018   Substance Use Topics    Alcohol use: Yes     Comment: rarely    Drug use: No       SCREENINGS             PHYSICAL EXAM    (up to 7 for level 4, 8 or more for level 5)     ED Triage Vitals [21 2341]   BP Temp Temp src Pulse Resp SpO2 Height Weight   139/87 97.3 °F (36.3 °C) -- 60 20 98 % 5' 2.5\" (1.588 m) 165 lb (74.8 kg)       Physical Exam  Vitals signs and nursing note reviewed. Constitutional:       Appearance: Normal appearance. She is well-developed and normal weight. HENT:      Head: Normocephalic and atraumatic. Right Ear: Tympanic membrane, ear canal and external ear normal.      Left Ear: Tympanic membrane, ear canal and external ear normal.      Mouth/Throat:      Comments: Patient with gingival inflammation. Patient with halitosis. Patient with no facial asymmetry. Patient has very poor dentition several down to the gumline. She does open mouth widely. She does swallow secretion has been clearly. Eyes:      General: No scleral icterus. Right eye: No discharge. Left eye: No discharge. Conjunctiva/sclera: Conjunctivae normal.   Neck:      Musculoskeletal: Normal range of motion and neck supple. No muscular tenderness. Cardiovascular:      Rate and Rhythm: Normal rate and regular rhythm.       Heart sounds: Normal heart sounds. Pulmonary:      Effort: Pulmonary effort is normal.      Breath sounds: Normal breath sounds. Musculoskeletal: Normal range of motion. Lymphadenopathy:      Cervical: No cervical adenopathy. Skin:     General: Skin is warm and dry. Neurological:      General: No focal deficit present. Mental Status: She is alert and oriented to person, place, and time. Mental status is at baseline. Psychiatric:         Mood and Affect: Mood normal.         Behavior: Behavior normal.         Thought Content: Thought content normal.         Judgment: Judgment normal.         DIAGNOSTIC RESULTS   LABS:    Labs Reviewed - No data to display    All other labs were within normal range or not returned as of this dictation. EKG: All EKG's are interpreted by the Emergency Department Physician in the absence of a cardiologist.  Please see their note for interpretation of EKG. RADIOLOGY:   Non-plain film images such as CT, Ultrasound and MRI are read by the radiologist. Plain radiographic images are visualized and preliminarily interpreted by the ED Provider with the below findings:        Interpretation per the Radiologist below, if available at the time of this note:    No orders to display     No results found. PROCEDURES   Unless otherwise noted below, none     Procedures    CRITICAL CARE TIME   N/A    CONSULTS:  None      EMERGENCY DEPARTMENT COURSE and DIFFERENTIAL DIAGNOSIS/MDM:   Vitals:    Vitals:    01/07/21 2341   BP: 139/87   Pulse: 60   Resp: 20   Temp: 97.3 °F (36.3 °C)   SpO2: 98%   Weight: 165 lb (74.8 kg)   Height: 5' 2.5\" (1.588 m)       Patient was given the following medications:  Medications   clindamycin (CLEOCIN) capsule 300 mg (300 mg Oral Given 1/8/21 0111)   acetaminophen (TYLENOL) tablet 1,000 mg (1,000 mg Oral Given 1/8/21 0110)           Patient has a dental infection and general gingivitis. I will treat with clindamycin 300 mg 3 times daily 1 week.   Clindamycin 300 mg given here in the emergency room. She was also given Tylenol 1000 mg p.o. in the emergency room. She will continue Tylenol 1000 g 3 times daily at home. I did prescribe and instruct her on the use of Peridex of administering 10 mL swish and spit and no food or drink 2 hours post treatment. .  In between the Peridex twice daily dosing she is to utilize warm salt water and small amount of peroxide in the water. She is to contact and follow with dental services. She will need extensive dental work. The patient does express understanding the diagnosis and the treatment plan. FINAL IMPRESSION      1. Dental infection    2. Gingivitis    3. Otalgia of both ears          DISPOSITION/PLAN   DISPOSITION Decision To Discharge 01/08/2021 12:59:52 AM      PATIENT REFERREDTO:  Your dentist    Schedule an appointment as soon as possible for a visit on 1/11/2021      Mor Amaya MD  [address]  [phone]    Schedule an appointment as soon as possible for a visit on 1/11/2021      [facility]  [address]  Go to   If symptoms worsen      DISCHARGE MEDICATIONS:  Discharge Medication List as of 1/8/2021  1:04 AM      START taking these medications    Details   clindamycin (CLEOCIN) 300 MG capsule Take 1 capsule by mouth 3 times daily for 7 days, Disp-21 capsule, R-0Print      chlorhexidine (PERIDEX) 0.12 % solution 10 mL twice daily swish and spit. No food or drink 2 hours after treatment, Disp-420 mL, R-0Print      HYDROcodone-acetaminophen (NORCO) 5-325 MG per tablet Take 1 tablet by mouth every 6 hours as needed for Pain for up to 3 days. , Disp-7 tablet, R-0Print             DISCONTINUED MEDICATIONS:  Discharge Medication List as of 1/8/2021  1:04 AM            Periodic Controlled Substance Monitoring: No signs of potential drug abuse or diversion identified.  Gracia Genao PA-C)    (Please note that portions of this note were completed with a voice recognition program.  Efforts were made to edit the dictations but occasionally words are mis-transcribed. )    Parks Paget, PA-C (electronically signed)           Parks Paget, PA-C  01/08/21 [id number]

## 2021-01-16 ENCOUNTER — APPOINTMENT (OUTPATIENT)
Dept: CT IMAGING | Age: 51
End: 2021-01-16
Payer: MEDICAID

## 2021-01-16 ENCOUNTER — HOSPITAL ENCOUNTER (EMERGENCY)
Age: 51
Discharge: HOME | End: 2021-01-16
Attending: EMERGENCY MEDICINE
Payer: MEDICAID

## 2021-01-16 VITALS
HEIGHT: 62 IN | RESPIRATION RATE: 18 BRPM | SYSTOLIC BLOOD PRESSURE: 137 MMHG | DIASTOLIC BLOOD PRESSURE: 79 MMHG | WEIGHT: 165 LBS | BODY MASS INDEX: 30.36 KG/M2 | TEMPERATURE: 98.5 F | HEART RATE: 72 BPM | OXYGEN SATURATION: 99 %

## 2021-01-16 DIAGNOSIS — K04.7 DENTAL INFECTION: ICD-10-CM

## 2021-01-16 DIAGNOSIS — S16.1XXA STRAIN OF NECK MUSCLE, INITIAL ENCOUNTER: Primary | ICD-10-CM

## 2021-01-16 LAB
ANION GAP SERPL CALCULATED.3IONS-SCNC: 9 MMOL/L (ref 3–16)
BASOPHILS ABSOLUTE: 0.1 K/UL (ref 0–0.2)
BASOPHILS RELATIVE PERCENT: 0.6 %
BUN BLDV-MCNC: 7 MG/DL (ref 7–20)
CALCIUM SERPL-MCNC: 9.8 MG/DL (ref 8.3–10.6)
CHLORIDE BLD-SCNC: 100 MMOL/L (ref 99–110)
CO2: 28 MMOL/L (ref 21–32)
CREAT SERPL-MCNC: 0.8 MG/DL (ref 0.6–1.1)
EOSINOPHILS ABSOLUTE: 0.3 K/UL (ref 0–0.6)
EOSINOPHILS RELATIVE PERCENT: 3.3 %
GFR AFRICAN AMERICAN: >60
GFR NON-AFRICAN AMERICAN: >60
GLUCOSE BLD-MCNC: 105 MG/DL (ref 70–99)
HCG(URINE) PREGNANCY TEST: NEGATIVE
HCT VFR BLD CALC: 42.5 % (ref 36–48)
HEMOGLOBIN: 14 G/DL (ref 12–16)
LYMPHOCYTES ABSOLUTE: 2.4 K/UL (ref 1–5.1)
LYMPHOCYTES RELATIVE PERCENT: 29 %
MCH RBC QN AUTO: 30.9 PG (ref 26–34)
MCHC RBC AUTO-ENTMCNC: 33 G/DL (ref 31–36)
MCV RBC AUTO: 93.8 FL (ref 80–100)
MONOCYTES ABSOLUTE: 0.4 K/UL (ref 0–1.3)
MONOCYTES RELATIVE PERCENT: 4.7 %
NEUTROPHILS ABSOLUTE: 5.2 K/UL (ref 1.7–7.7)
NEUTROPHILS RELATIVE PERCENT: 62.4 %
PDW BLD-RTO: 14.9 % (ref 12.4–15.4)
PLATELET # BLD: 369 K/UL (ref 135–450)
PMV BLD AUTO: 8.8 FL (ref 5–10.5)
POTASSIUM REFLEX MAGNESIUM: 4.2 MMOL/L (ref 3.5–5.1)
RBC # BLD: 4.53 M/UL (ref 4–5.2)
S PYO AG THROAT QL: NEGATIVE
SODIUM BLD-SCNC: 137 MMOL/L (ref 136–145)
WBC # BLD: 8.3 K/UL (ref 4–11)

## 2021-01-16 PROCEDURE — 80048 BASIC METABOLIC PNL TOTAL CA: CPT

## 2021-01-16 PROCEDURE — 87880 STREP A ASSAY W/OPTIC: CPT

## 2021-01-16 PROCEDURE — 70491 CT SOFT TISSUE NECK W/DYE: CPT

## 2021-01-16 PROCEDURE — 99283 EMERGENCY DEPT VISIT LOW MDM: CPT

## 2021-01-16 PROCEDURE — 84703 CHORIONIC GONADOTROPIN ASSAY: CPT

## 2021-01-16 PROCEDURE — 70450 CT HEAD/BRAIN W/O DYE: CPT

## 2021-01-16 PROCEDURE — 96374 THER/PROPH/DIAG INJ IV PUSH: CPT

## 2021-01-16 PROCEDURE — 85025 COMPLETE CBC W/AUTO DIFF WBC: CPT

## 2021-01-16 PROCEDURE — 6360000002 HC RX W HCPCS: Performed by: EMERGENCY MEDICINE

## 2021-01-16 PROCEDURE — 87081 CULTURE SCREEN ONLY: CPT

## 2021-01-16 PROCEDURE — 6360000004 HC RX CONTRAST MEDICATION: Performed by: EMERGENCY MEDICINE

## 2021-01-16 PROCEDURE — 6370000000 HC RX 637 (ALT 250 FOR IP): Performed by: EMERGENCY MEDICINE

## 2021-01-16 PROCEDURE — 70498 CT ANGIOGRAPHY NECK: CPT

## 2021-01-16 RX ORDER — KETOROLAC TROMETHAMINE 30 MG/ML
15 INJECTION, SOLUTION INTRAMUSCULAR; INTRAVENOUS ONCE
Status: COMPLETED | OUTPATIENT
Start: 2021-01-16 | End: 2021-01-16

## 2021-01-16 RX ORDER — METHOCARBAMOL 750 MG/1
750 TABLET, FILM COATED ORAL 3 TIMES DAILY PRN
Qty: 30 TABLET | Refills: 0 | Status: SHIPPED | OUTPATIENT
Start: 2021-01-16 | End: 2021-01-26

## 2021-01-16 RX ORDER — LIDOCAINE 4 G/G
1 PATCH TOPICAL ONCE
Status: DISCONTINUED | OUTPATIENT
Start: 2021-01-16 | End: 2021-01-16 | Stop reason: HOSPADM

## 2021-01-16 RX ORDER — AMOXICILLIN AND CLAVULANATE POTASSIUM 875; 125 MG/1; MG/1
1 TABLET, FILM COATED ORAL 2 TIMES DAILY
Qty: 20 TABLET | Refills: 0 | Status: SHIPPED | OUTPATIENT
Start: 2021-01-16 | End: 2021-01-23 | Stop reason: SINTOL

## 2021-01-16 RX ADMIN — IOPAMIDOL 75 ML: 755 INJECTION, SOLUTION INTRAVENOUS at 11:21

## 2021-01-16 RX ADMIN — IOPAMIDOL 75 ML: 755 INJECTION, SOLUTION INTRAVENOUS at 12:24

## 2021-01-16 RX ADMIN — KETOROLAC TROMETHAMINE 15 MG: 30 INJECTION, SOLUTION INTRAMUSCULAR at 11:43

## 2021-01-16 ASSESSMENT — PAIN DESCRIPTION - PAIN TYPE: TYPE: ACUTE PAIN

## 2021-01-16 ASSESSMENT — PAIN DESCRIPTION - LOCATION: LOCATION: NECK

## 2021-01-16 ASSESSMENT — PAIN SCALES - GENERAL: PAINLEVEL_OUTOF10: 4

## 2021-01-16 ASSESSMENT — PAIN DESCRIPTION - DESCRIPTORS: DESCRIPTORS: ACHING;CONSTANT

## 2021-01-16 NOTE — ED PROVIDER NOTES
Emergency Department Provider Note  Location: [place] [address] ED  1/16/2021     Patient Identification  Brit Perdue is a 48 y.o. female    Chief Complaint  Neck Pain (reports swollen area behind right ear that started today, neck pain has been going on for a while (was seen in [facility] ER last week for toothache on right side) ringing in ears and headahe for couple weeks)          HPI  (History provided by patient)  Patient is a 80-year-old female with history of dental infections recently diagnosed with dental infection January 7 and completed course of clindamycin who presents with return of dental pain and right-sided neck pain. Patient says she has had right mandibular molar pain off and on for the past year. Patient reports she is compliant with clindamycin. States that she now has a achy sensation behind her right ear lateral aspect of the right neck. It is slightly increased with turning her head side to side. She is able to range it up and down to touch her chin to her chest.  She denies any fevers or chills or neck swelling. She does report some right-sided ear pain however this has been going on for a few weeks. She denies any numbness weakness paralysis. I have reviewed the following nursing documentation:  Allergies: Allergies   Allergen Reactions    Aspirin Swelling    Bee Venom Swelling    Lactose Intolerance (Gi)        Past medical history:  has a past medical history of Dental abscess. Past surgical history:  has a past surgical history that includes Tonsillectomy; Ankle surgery (Left, 2008); and Tubal ligation (2001). Home medications:   Prior to Admission medications    Medication Sig Start Date End Date Taking?  Authorizing Provider   amoxicillin-clavulanate (AUGMENTIN) 875-125 MG per tablet Take 1 tablet by mouth 2 times daily for 10 days 1/16/21 1/26/21 Yes Emigdio Almonte MD   methocarbamol (ROBAXIN-750) 750 MG tablet Take 1 tablet by mouth 3 times daily as needed (muscle spasm) 1/16/21 1/26/21 Yes Sinai Baez MD   acetaminophen (TYLENOL) 325 MG tablet Take 650 mg by mouth every 6 hours as needed for Pain    Historical Provider, MD       Social history:  reports that she quit smoking about 2 years ago. She has never used smokeless tobacco. She reports current alcohol use. She reports that she does not use drugs. Family history:    Family History   Problem Relation Age of Onset    Heart Attack Mother 55    High Cholesterol Mother     Early Death Mother 55    Heart Disease Mother         CABG    High Cholesterol Father     Pancreatic Cancer Father     Cancer Maternal Grandmother         uterine          ROS  Review of Systems   Constitutional: Negative for chills and fever. HENT: Positive for dental problem and ear pain. Negative for congestion, hearing loss, rhinorrhea, sinus pressure and trouble swallowing. Eyes: Negative for photophobia and visual disturbance. Respiratory: Negative for cough, shortness of breath and wheezing. Cardiovascular: Negative for chest pain and palpitations. Gastrointestinal: Negative for abdominal distention, diarrhea, nausea and vomiting. Genitourinary: Negative for dysuria and hematuria. Musculoskeletal: Positive for neck pain. Negative for back pain. Skin: Negative for rash and wound. Neurological: Negative for syncope and weakness. Psychiatric/Behavioral: Negative for agitation and confusion. Exam  ED Triage Vitals   BP Temp Temp src Pulse Resp SpO2 Height Weight   -- -- -- -- -- -- -- --       Physical Exam  Vitals signs and nursing note reviewed. Constitutional:       General: She is not in acute distress. Appearance: She is well-developed. HENT:      Head: Normocephalic and atraumatic. Right Ear: Tympanic membrane and ear canal normal.      Left Ear: Tympanic membrane and ear canal normal.      Nose: Nose normal. No congestion.       Mouth/Throat:      Comments: Poor dentition multiple dental caries. Dental carry right maxillary molars with tenderness to the tapping with tongue depressor. There is no gingival swelling or oral pharyngeal swelling no obvious periapical abscess identified. There is mild erythema noted on the right pillar, uvula is midline. Eyes:      Extraocular Movements: Extraocular movements intact. Pupils: Pupils are equal, round, and reactive to light. Neck:      Musculoskeletal: Normal range of motion and neck supple. No neck rigidity. Comments: No objective swelling of the neck. There is tenderness identified just inferior to and including the right mastoid process. There is no crepitus. Cardiovascular:      Rate and Rhythm: Normal rate and regular rhythm. Heart sounds: No murmur. Pulmonary:      Effort: Pulmonary effort is normal.      Breath sounds: Normal breath sounds. Abdominal:      General: There is no distension. Palpations: Abdomen is soft. Tenderness: There is no abdominal tenderness. Musculoskeletal: Normal range of motion. General: No deformity. Skin:     General: Skin is warm. Findings: No rash. Neurological:      Mental Status: She is alert and oriented to person, place, and time. Motor: No abnormal muscle tone.       Coordination: Coordination normal.   Psychiatric:         Mood and Affect: Mood normal.         Behavior: Behavior normal.           ED Course    ED Medication Orders (From admission, onward)    Start Ordered     Status Ordering Provider    01/16/21 1222 01/16/21 1222  iopamidol (ISOVUE-370) 76 % injection 75 mL  IMG ONCE PRN      Last MAR action: Given - by Angi Bonilla on 01/16/21 at 1224 PARDEEP, KAYLA L    01/16/21 1118 01/16/21 1118  iopamidol (ISOVUE-370) 76 % injection 75 mL  IMG ONCE PRN      Last MAR action: Given - by Angi Bonilla on 01/16/21 at [address], KAYLA L    01/16/21 1015 01/16/21 1002  ketorolac (TORADOL) injection 15 mg  ONCE      Last MAR action: Given - by Trish Portillo on 01/16/21 at . [address], [id number] [facility] Harriet SHULTZ            Radiology  Ct Head Wo Contrast    Result Date: 1/16/2021  EXAMINATION: CT OF THE HEAD WITHOUT CONTRAST  1/16/2021 12:21 pm TECHNIQUE: CT of the head was performed without the administration of intravenous contrast. Dose modulation, iterative reconstruction, and/or weight based adjustment of the mA/kV was utilized to reduce the radiation dose to as low as reasonably achievable. COMPARISON: None. HISTORY: ORDERING SYSTEM PROVIDED HISTORY: right ICA dissection? TECHNOLOGIST PROVIDED HISTORY: Reason for exam:->right ICA dissection? Has a \"code stroke\" or \"stroke alert\" been called? ->No Is the patient pregnant?->No Reason for Exam: right ICA dissection? Acuity: Acute Type of Exam: Subsequent/Follow-up FINDINGS: BRAIN/VENTRICLES: There is no acute intracranial hemorrhage, mass effect or midline shift. No abnormal extra-axial fluid collection. The gray-white differentiation is maintained without evidence of an acute infarct. There is no evidence of hydrocephalus. ORBITS: The visualized portion of the orbits demonstrate no acute abnormality. SINUSES: The visualized paranasal sinuses and mastoid air cells demonstrate no acute abnormality. SOFT TISSUES/SKULL:  No acute abnormality of the visualized skull or soft tissues. No acute intracranial abnormality. Ct Soft Tissue Neck W Contrast    Result Date: 1/16/2021  EXAMINATION: CT OF THE NECK SOFT TISSUE WITH CONTRAST  1/16/2021 TECHNIQUE: CT of the neck was performed with the administration of intravenous contrast. Multiplanar reformatted images are provided for review. Dose modulation, iterative reconstruction, and/or weight based adjustment of the mA/kV was utilized to reduce the radiation dose to as low as reasonably achievable. COMPARISON: None.  HISTORY: ORDERING SYSTEM PROVIDED HISTORY: neck pain, rt mastoid pain, please get mastoid process TECHNOLOGIST PROVIDED HISTORY: Reason for exam:->neck pain, rt mastoid pain, please get mastoid process Reason for Exam: Neck Pain (reports swollen area behind right ear that started today, neck pain has been going on for a while (was seen in [facility] ER last week for toothache on right side) ringing in ears and headahe for couple weeks) Acuity: Chronic Type of Exam: Initial FINDINGS: PHARYNX/LARYNX:  There is mild asymmetry of the tonsils with the right tonsillar pillar being slightly larger than the left. There is no inflammatory changes noted however there is no effacement of vallecular or piriform sinus. The tongue is normal in appearance. The valleculae, epiglottis, aryepiglottic folds and pyriform sinuses appear unremarkable. The true and false vocal cords are normal in appearance. No mass or abscess is seen. Abnormal appearance of the distal right internal carotid artery beginning at the level of C2 raising the suggestive of the a dissection versus intraluminal thrombus. Abnormal appearance to the right vertebral artery there appears to be possible inclusion of the right vertebral artery as it enters the transverse foramina of C7. Further evaluation versus dedicated CT a of the neck is recommended. SALIVARY GLANDS/THYROID:  The parotid and submandibular glands appear unremarkable. The thyroid gland appears unremarkable. LYMPH NODES:  No cervical or supraclavicular lymphadenopathy is seen. SOFT TISSUES:  No appreciable soft tissue swelling or mass is seen. BRAIN/ORBITS/SINUSES:  The visualized portion of the intracranial contents appear unremarkable. The visualized portion of the orbits, paranasal sinuses and mastoid air cells demonstrate no acute abnormality. LUNG APICES/SUPERIOR MEDIASTINUM:  No focal consolidation is seen within the visualized lung apices. No superior mediastinal lymphadenopathy or mass. The visualized portion of the trachea appears unremarkable. BONES:  No aggressive appearing lytic or blastic bony lesion. Minimal prominence of the right tonsillar pillar with no evidence for abscess. There is no inflammatory changes of the paralaryngeal fat. There is some mild effacement of the right hypopharynx but no evidence for effacement of the right vallecula or right piriform sinus. No abnormality in the adjacent to the mastoids no evidence for mastoid effusion. Abnormal appearance of the right vertebral artery which appears represent a proximal occlusion at C7. RECOMMENDATIONS: CTA neck     Cta Head Neck W Contrast    Result Date: 1/16/2021  EXAMINATION: CTA OF THE HEAD AND NECK WITH CONTRAST 1/16/2021 12:22 pm: TECHNIQUE: CTA of the head and neck was performed with the administration of intravenous contrast. Multiplanar reformatted images are provided for review. MIP images are provided for review. Stenosis of the internal carotid arteries measured using NASCET criteria. Dose modulation, iterative reconstruction, and/or weight based adjustment of the mA/kV was utilized to reduce the radiation dose to as low as reasonably achievable. COMPARISON: CT on 01/16/2021 HISTORY: ORDERING SYSTEM PROVIDED HISTORY: right neck pain, possible right carotid dissection TECHNOLOGIST PROVIDED HISTORY: Reason for exam:->right neck pain, possible right carotid dissection Reason for Exam: right ICA dissection? Acuity: Chronic Type of Exam: Initial Additional signs and symptoms: neck pain x 1 week FINDINGS: CTA NECK: AORTIC ARCH/ARCH VESSELS: There is a normal branch pattern of the aortic arch. The innominate and subclavian arteries are patent. CAROTID ARTERIES: The common carotid arteries are normal in caliber. The internal carotid arteries are tortuous, likely related to underlying hypertension. There are several prominent kinks in the right internal carotid artery simulating dissections with less than 25% stenosis. No flow-limiting stenosis. VERTEBRAL ARTERIES: The left vertebral artery is normal in caliber.   The right vertebral CBC Auto Differential   Result Value Ref Range    WBC 8.3 4.0 - 11.0 K/uL    RBC 4.53 4.00 - 5.20 M/uL    Hemoglobin 14.0 12.0 - 16.0 g/dL    Hematocrit 42.5 36.0 - 48.0 %    MCV 93.8 80.0 - 100.0 fL    MCH 30.9 26.0 - 34.0 pg    MCHC 33.0 31.0 - 36.0 g/dL    RDW 14.9 12.4 - 15.4 %    Platelets 441 753 - 809 K/uL    MPV 8.8 5.0 - 10.5 fL    Neutrophils % 62.4 %    Lymphocytes % 29.0 %    Monocytes % 4.7 %    Eosinophils % 3.3 %    Basophils % 0.6 %    Neutrophils Absolute 5.2 1.7 - 7.7 K/uL    Lymphocytes Absolute 2.4 1.0 - 5.1 K/uL    Monocytes Absolute 0.4 0.0 - 1.3 K/uL    Eosinophils Absolute 0.3 0.0 - 0.6 K/uL    Basophils Absolute 0.1 0.0 - 0.2 K/uL   Basic Metabolic Panel w/ Reflex to MG   Result Value Ref Range    Sodium 137 136 - 145 mmol/L    Potassium reflex Magnesium 4.2 3.5 - 5.1 mmol/L    Chloride 100 99 - 110 mmol/L    CO2 28 21 - 32 mmol/L    Anion Gap 9 3 - 16    Glucose 105 (H) 70 - 99 mg/dL    BUN 7 7 - 20 mg/dL    CREATININE 0.8 0.6 - 1.1 mg/dL    GFR Non-African American >60 >60    GFR African American >60 >60    Calcium 9.8 8.3 - 10.6 mg/dL   Pregnancy, Urine   Result Value Ref Range    HCG(Urine) Pregnancy Test Negative Detects HCG level >20 MIU/mL         [facility]  Patient seen and evaluated. Relevant records reviewed. 61-year-old female who presents with return of her chronic dental pain which is not seen a dentist or an 1 week of right-sided neck pain. On exam she is well-appearing no acute distress, her vital signs are completely within normal limits. She has multiple dental caries and likely acute dental infection that is recurrent in the right maxillary molar. She also has tenderness on exam of the right mastoid process and just inferior to on the neck. There is some erythema on the left pillar of the oropharynx, therefore CT soft tissue of the neck was taken to further risk ratified for any deep-seated infection.   She was sent for CT and I received emergent call from radiologist MD Jose Luis  [address] [place]  [phone]    Schedule an appointment as soon as possible for a visit       [facility] oral maxillofacial surgery  Division of Oral and Maxillofacial Surgery  Leslye Barragan  [address]    Phone: [phone]            Total critical care time is 35 minutes, which excludes separately billable procedures and updating family. Time spent is specifically for management of the presenting complaint and symptoms initially, direct bedside care, reevaluation, review of records, and consultation. There was a high probability of clinically significant life-threatening deterioration in the patient's condition, which required my urgent intervention. This chart was generated in part by using Dragon Dictation system and may contain errors related to that system including errors in grammar, punctuation, and spelling, as well as words and phrases that may be inappropriate. If there are any questions or concerns please feel free to contact the dictating provider for clarification.      MD Jose J Blakely MD  01/17/21 8482

## 2021-01-16 NOTE — ED NOTES
Discharge instructions reviewed, patient verbalizes understanding. Denies questions/concerns at this time. Patient ambulatory out of ED in stable condition with all belongings.        Ashlee Kennedy RN  01/16/21 0617

## 2021-01-17 ASSESSMENT — ENCOUNTER SYMPTOMS
BACK PAIN: 0
PHOTOPHOBIA: 0
COUGH: 0
ABDOMINAL DISTENTION: 0
WHEEZING: 0
DIARRHEA: 0
VOMITING: 0
NAUSEA: 0
SINUS PRESSURE: 0
RHINORRHEA: 0
TROUBLE SWALLOWING: 0
SHORTNESS OF BREATH: 0

## 2021-01-18 LAB — S PYO THROAT QL CULT: NORMAL

## 2021-01-20 ENCOUNTER — OFFICE VISIT (OUTPATIENT)
Dept: INTERNAL MEDICINE CLINIC | Age: 51
End: 2021-01-20
Payer: MEDICAID

## 2021-01-20 VITALS
SYSTOLIC BLOOD PRESSURE: 132 MMHG | TEMPERATURE: 97.8 F | HEIGHT: 62 IN | DIASTOLIC BLOOD PRESSURE: 82 MMHG | OXYGEN SATURATION: 98 % | WEIGHT: 180 LBS | HEART RATE: 71 BPM | BODY MASS INDEX: 33.13 KG/M2

## 2021-01-20 DIAGNOSIS — G43.009 MIGRAINE WITHOUT AURA AND WITHOUT STATUS MIGRAINOSUS, NOT INTRACTABLE: ICD-10-CM

## 2021-01-20 DIAGNOSIS — S16.1XXS STRAIN OF NECK MUSCLE, SEQUELA: Primary | ICD-10-CM

## 2021-01-20 PROCEDURE — G8427 DOCREV CUR MEDS BY ELIG CLIN: HCPCS | Performed by: INTERNAL MEDICINE

## 2021-01-20 PROCEDURE — 1036F TOBACCO NON-USER: CPT | Performed by: INTERNAL MEDICINE

## 2021-01-20 PROCEDURE — G8484 FLU IMMUNIZE NO ADMIN: HCPCS | Performed by: INTERNAL MEDICINE

## 2021-01-20 PROCEDURE — 3017F COLORECTAL CA SCREEN DOC REV: CPT | Performed by: INTERNAL MEDICINE

## 2021-01-20 PROCEDURE — G8417 CALC BMI ABV UP PARAM F/U: HCPCS | Performed by: INTERNAL MEDICINE

## 2021-01-20 PROCEDURE — 99213 OFFICE O/P EST LOW 20 MIN: CPT | Performed by: INTERNAL MEDICINE

## 2021-01-20 RX ORDER — PREDNISONE 20 MG/1
TABLET ORAL
Qty: 9 TABLET | Refills: 0 | Status: SHIPPED | OUTPATIENT
Start: 2021-01-20 | End: 2021-02-09

## 2021-01-20 RX ORDER — AMITRIPTYLINE HYDROCHLORIDE 25 MG/1
25 TABLET, FILM COATED ORAL NIGHTLY
Qty: 30 TABLET | Refills: 0 | Status: SHIPPED | OUTPATIENT
Start: 2021-01-20 | End: 2021-02-09

## 2021-01-20 RX ORDER — SUMATRIPTAN 100 MG/1
100 TABLET, FILM COATED ORAL
Qty: 9 TABLET | Refills: 0 | Status: SHIPPED | OUTPATIENT
Start: 2021-01-20 | End: 2021-02-09

## 2021-01-20 ASSESSMENT — PATIENT HEALTH QUESTIONNAIRE - PHQ9
1. LITTLE INTEREST OR PLEASURE IN DOING THINGS: 1
SUM OF ALL RESPONSES TO PHQ QUESTIONS 1-9: 2
SUM OF ALL RESPONSES TO PHQ9 QUESTIONS 1 & 2: 2
2. FEELING DOWN, DEPRESSED OR HOPELESS: 1

## 2021-01-20 NOTE — PROGRESS NOTES
Turner Chao  YOB: 1970    Date of Service:  1/20/2021    Chief Complaint:      Chief Complaint   Patient presents with    Neck Pain       HPI:  Turner Chao is a 48 y.o. She complain of right neck nodule that has improved since being in the ER 1/16/21. Still on Augmentin for dental infection left lower tooth. She also complain of headaches daily since getting off caffeine for the last week. Pain is moderate. She has been having difficulty sleeping lately.       Lab Results   Component Value Date    LABMICR Yes 11/28/2015     Lab Results   Component Value Date     01/16/2021    K 4.2 01/16/2021     01/16/2021    CO2 28 01/16/2021    BUN 7 01/16/2021    CREATININE 0.8 01/16/2021    GLUCOSE 105 (H) 01/16/2021    CALCIUM 9.8 01/16/2021     No results found for: CHOL, TRIG, HDL, LDLCALC, LDLDIRECT  No results found for: ALT, AST  No results found for: TSH, T4FREE  Lab Results   Component Value Date    WBC 8.3 01/16/2021    HGB 14.0 01/16/2021    HCT 42.5 01/16/2021    MCV 93.8 01/16/2021     01/16/2021     Lab Results   Component Value Date    INR 0.96 11/27/2015      No results found for: PSA   No results found for: [facility]     Patient Active Problem List   Diagnosis    Mixed stress and urge urinary incontinence    Chronic wrist pain, left       Allergies   Allergen Reactions    Aspirin Swelling    Bee Venom Swelling    Lactose Intolerance (Gi)      Outpatient Medications Marked as Taking for the 1/20/21 encounter (Office Visit) with Stephanie Amaya MD   Medication Sig Dispense Refill    amoxicillin-clavulanate (AUGMENTIN) 875-125 MG per tablet Take 1 tablet by mouth 2 times daily for 10 days 20 tablet 0    methocarbamol (ROBAXIN-750) 750 MG tablet Take 1 tablet by mouth 3 times daily as needed (muscle spasm) 30 tablet 0    acetaminophen (TYLENOL) 325 MG tablet Take 650 mg by mouth every 6 hours as needed for Pain Review of Systems: 14 systems were negative except of what was stated on HPI    Nursing note and vitals reviewed. Vitals:    01/20/21 1120   BP: 132/82   Pulse: 71   Temp: 97.8 °F (36.6 °C)   TempSrc: Infrared   SpO2: 98%   Weight: 180 lb (81.6 kg)   Height: 5' 2\" (1.575 m)     Wt Readings from Last 3 Encounters:   01/20/21 180 lb (81.6 kg)   01/16/21 165 lb (74.8 kg)   01/07/21 165 lb (74.8 kg)     BP Readings from Last 3 Encounters:   01/20/21 132/82   01/16/21 137/79   01/07/21 139/87     Body mass index is 32.92 kg/m². Constitutional: Patient appears well-developed and well-nourished. No distress. Head: Normocephalic and atraumatic. Neck: Normal range of motion. Neck supple. No thyroidmegaly. Cardiovascular: Normal rate, regular rhythm, normal heart sounds and intact distal pulses. Pulmonary/Chest: Effort normal and breath sounds normal. No stridor. No respiratory distress. No wheezes and no rales. Abdominal: Soft. Bowel sounds are normal. No distension and no mass. No tenderness. No rebound and no guarding. Musculoskeletal: No edema and no tenderness. Skin: No rash or erythema. Psychiatric: Normal mood and affect. Behavior is normal.     Assessment/Plan:  Mikey Roper was seen today for neck pain. Diagnoses and all orders for this visit:    Strain of neck muscle, sequela  -     predniSONE (DELTASONE) 20 MG tablet; Take 3 pills in the AM for 3    Migraine without aura and without status migrainosus, not intractable  Start  amitriptyline (ELAVIL) 25 MG tablet; Take 1 tablet by mouth nightly  Start SUMAtriptan (IMITREX) 100 MG tablet; Take 1 tablet by mouth once as needed for Migraine May repeat in 2 hrs, max 2 pills within 24 hr  Start predniSONE (DELTASONE) 20 MG tablet; Take 3 pills in the AM for 3        Return Feb 9 at 12:10 Headaches.

## 2021-01-23 ENCOUNTER — HOSPITAL ENCOUNTER (EMERGENCY)
Age: 51
Discharge: HOME | End: 2021-01-23
Attending: EMERGENCY MEDICINE
Payer: MEDICAID

## 2021-01-23 ENCOUNTER — APPOINTMENT (OUTPATIENT)
Dept: GENERAL RADIOLOGY | Age: 51
End: 2021-01-23
Payer: MEDICAID

## 2021-01-23 VITALS
RESPIRATION RATE: 20 BRPM | OXYGEN SATURATION: 98 % | WEIGHT: 180 LBS | BODY MASS INDEX: 31.89 KG/M2 | DIASTOLIC BLOOD PRESSURE: 66 MMHG | HEIGHT: 63 IN | TEMPERATURE: 97.6 F | HEART RATE: 73 BPM | SYSTOLIC BLOOD PRESSURE: 114 MMHG

## 2021-01-23 DIAGNOSIS — R53.83 OTHER FATIGUE: Primary | ICD-10-CM

## 2021-01-23 DIAGNOSIS — R11.0 NAUSEA: ICD-10-CM

## 2021-01-23 DIAGNOSIS — R52 BODY ACHES: ICD-10-CM

## 2021-01-23 DIAGNOSIS — R07.9 CHEST PAIN, UNSPECIFIED TYPE: ICD-10-CM

## 2021-01-23 DIAGNOSIS — R09.81 NASAL CONGESTION: ICD-10-CM

## 2021-01-23 DIAGNOSIS — K08.89 ODONTALGIA: ICD-10-CM

## 2021-01-23 LAB
A/G RATIO: 1.5 (ref 1.1–2.2)
ALBUMIN SERPL-MCNC: 4.9 G/DL (ref 3.4–5)
ALP BLD-CCNC: 96 U/L (ref 40–129)
ALT SERPL-CCNC: 59 U/L (ref 10–40)
ANION GAP SERPL CALCULATED.3IONS-SCNC: 12 MMOL/L (ref 3–16)
AST SERPL-CCNC: 38 U/L (ref 15–37)
BASOPHILS ABSOLUTE: 0.2 K/UL (ref 0–0.2)
BASOPHILS RELATIVE PERCENT: 1.3 %
BILIRUB SERPL-MCNC: 0.6 MG/DL (ref 0–1)
BILIRUBIN URINE: NEGATIVE
BLOOD, URINE: ABNORMAL
BUN BLDV-MCNC: 6 MG/DL (ref 7–20)
CALCIUM SERPL-MCNC: 10.1 MG/DL (ref 8.3–10.6)
CHLORIDE BLD-SCNC: 97 MMOL/L (ref 99–110)
CLARITY: CLEAR
CO2: 29 MMOL/L (ref 21–32)
COLOR: YELLOW
CREAT SERPL-MCNC: 0.9 MG/DL (ref 0.6–1.1)
EKG ATRIAL RATE: 61 BPM
EKG DIAGNOSIS: NORMAL
EKG P AXIS: 31 DEGREES
EKG P-R INTERVAL: 128 MS
EKG Q-T INTERVAL: 442 MS
EKG QRS DURATION: 110 MS
EKG QTC CALCULATION (BAZETT): 444 MS
EKG R AXIS: -22 DEGREES
EKG T AXIS: 35 DEGREES
EKG VENTRICULAR RATE: 61 BPM
EOSINOPHILS ABSOLUTE: 0.1 K/UL (ref 0–0.6)
EOSINOPHILS RELATIVE PERCENT: 0.6 %
EPITHELIAL CELLS, UA: ABNORMAL /HPF (ref 0–5)
GFR AFRICAN AMERICAN: >60
GFR NON-AFRICAN AMERICAN: >60
GLOBULIN: 3.3 G/DL
GLUCOSE BLD-MCNC: 106 MG/DL (ref 70–99)
GLUCOSE URINE: NEGATIVE MG/DL
HCG QUALITATIVE: NEGATIVE
HCT VFR BLD CALC: 40.7 % (ref 36–48)
HEMOGLOBIN: 13.5 G/DL (ref 12–16)
KETONES, URINE: NEGATIVE MG/DL
LACTIC ACID: 1.1 MMOL/L (ref 0.4–2)
LEUKOCYTE ESTERASE, URINE: NEGATIVE
LYMPHOCYTES ABSOLUTE: 3.3 K/UL (ref 1–5.1)
LYMPHOCYTES RELATIVE PERCENT: 26.5 %
MAGNESIUM: 2.2 MG/DL (ref 1.8–2.4)
MCH RBC QN AUTO: 31.1 PG (ref 26–34)
MCHC RBC AUTO-ENTMCNC: 33.2 G/DL (ref 31–36)
MCV RBC AUTO: 93.7 FL (ref 80–100)
MICROSCOPIC EXAMINATION: YES
MONOCYTES ABSOLUTE: 0.7 K/UL (ref 0–1.3)
MONOCYTES RELATIVE PERCENT: 5.7 %
NEUTROPHILS ABSOLUTE: 8.1 K/UL (ref 1.7–7.7)
NEUTROPHILS RELATIVE PERCENT: 65.9 %
NITRITE, URINE: NEGATIVE
PDW BLD-RTO: 15.1 % (ref 12.4–15.4)
PH UA: 6 (ref 5–8)
PLATELET # BLD: 391 K/UL (ref 135–450)
PMV BLD AUTO: 9.5 FL (ref 5–10.5)
POTASSIUM REFLEX MAGNESIUM: 3.5 MMOL/L (ref 3.5–5.1)
PROCALCITONIN: 0.07 NG/ML (ref 0–0.15)
PROTEIN UA: NEGATIVE MG/DL
RBC # BLD: 4.34 M/UL (ref 4–5.2)
RBC UA: ABNORMAL /HPF (ref 0–4)
SARS-COV-2, NAAT: NOT DETECTED
SODIUM BLD-SCNC: 138 MMOL/L (ref 136–145)
SPECIFIC GRAVITY UA: <=1.005 (ref 1–1.03)
TOTAL PROTEIN: 8.2 G/DL (ref 6.4–8.2)
TROPONIN: <0.01 NG/ML
URINE REFLEX TO CULTURE: ABNORMAL
URINE TYPE: ABNORMAL
UROBILINOGEN, URINE: 0.2 E.U./DL
WBC # BLD: 12.3 K/UL (ref 4–11)
WBC UA: ABNORMAL /HPF (ref 0–5)

## 2021-01-23 PROCEDURE — U0002 COVID-19 LAB TEST NON-CDC: HCPCS

## 2021-01-23 PROCEDURE — 83605 ASSAY OF LACTIC ACID: CPT

## 2021-01-23 PROCEDURE — 80053 COMPREHEN METABOLIC PANEL: CPT

## 2021-01-23 PROCEDURE — 96375 TX/PRO/DX INJ NEW DRUG ADDON: CPT

## 2021-01-23 PROCEDURE — 84484 ASSAY OF TROPONIN QUANT: CPT

## 2021-01-23 PROCEDURE — 71046 X-RAY EXAM CHEST 2 VIEWS: CPT

## 2021-01-23 PROCEDURE — 93010 ELECTROCARDIOGRAM REPORT: CPT | Performed by: INTERNAL MEDICINE

## 2021-01-23 PROCEDURE — 85025 COMPLETE CBC W/AUTO DIFF WBC: CPT

## 2021-01-23 PROCEDURE — 96374 THER/PROPH/DIAG INJ IV PUSH: CPT

## 2021-01-23 PROCEDURE — 93005 ELECTROCARDIOGRAM TRACING: CPT | Performed by: PHYSICIAN ASSISTANT

## 2021-01-23 PROCEDURE — 6360000002 HC RX W HCPCS: Performed by: PHYSICIAN ASSISTANT

## 2021-01-23 PROCEDURE — 81001 URINALYSIS AUTO W/SCOPE: CPT

## 2021-01-23 PROCEDURE — 2580000003 HC RX 258: Performed by: PHYSICIAN ASSISTANT

## 2021-01-23 PROCEDURE — 99283 EMERGENCY DEPT VISIT LOW MDM: CPT

## 2021-01-23 PROCEDURE — 84145 PROCALCITONIN (PCT): CPT

## 2021-01-23 PROCEDURE — 83735 ASSAY OF MAGNESIUM: CPT

## 2021-01-23 PROCEDURE — 84703 CHORIONIC GONADOTROPIN ASSAY: CPT

## 2021-01-23 RX ORDER — 0.9 % SODIUM CHLORIDE 0.9 %
1000 INTRAVENOUS SOLUTION INTRAVENOUS ONCE
Status: COMPLETED | OUTPATIENT
Start: 2021-01-23 | End: 2021-01-23

## 2021-01-23 RX ORDER — FLUTICASONE PROPIONATE 50 MCG
1 SPRAY, SUSPENSION (ML) NASAL DAILY
Qty: 1 BOTTLE | Refills: 0 | Status: SHIPPED | OUTPATIENT
Start: 2021-01-23

## 2021-01-23 RX ORDER — KETOROLAC TROMETHAMINE 30 MG/ML
15 INJECTION, SOLUTION INTRAMUSCULAR; INTRAVENOUS ONCE
Status: COMPLETED | OUTPATIENT
Start: 2021-01-23 | End: 2021-01-23

## 2021-01-23 RX ORDER — LORATADINE 10 MG/1
10 TABLET ORAL DAILY
Qty: 20 TABLET | Refills: 0 | Status: SHIPPED | OUTPATIENT
Start: 2021-01-23

## 2021-01-23 RX ORDER — DIPHENHYDRAMINE HYDROCHLORIDE 50 MG/ML
12.5 INJECTION INTRAMUSCULAR; INTRAVENOUS ONCE
Status: COMPLETED | OUTPATIENT
Start: 2021-01-23 | End: 2021-01-23

## 2021-01-23 RX ORDER — ONDANSETRON 4 MG/1
4 TABLET, FILM COATED ORAL EVERY 8 HOURS PRN
Qty: 10 TABLET | Refills: 0 | Status: SHIPPED | OUTPATIENT
Start: 2021-01-23 | End: 2021-02-09

## 2021-01-23 RX ORDER — ONDANSETRON 2 MG/ML
4 INJECTION INTRAMUSCULAR; INTRAVENOUS ONCE
Status: COMPLETED | OUTPATIENT
Start: 2021-01-23 | End: 2021-01-23

## 2021-01-23 RX ADMIN — ONDANSETRON HYDROCHLORIDE 4 MG: 2 INJECTION, SOLUTION INTRAMUSCULAR; INTRAVENOUS at 18:07

## 2021-01-23 RX ADMIN — SODIUM CHLORIDE 1000 ML: 9 INJECTION, SOLUTION INTRAVENOUS at 18:03

## 2021-01-23 RX ADMIN — DIPHENHYDRAMINE HYDROCHLORIDE 12.5 MG: 50 INJECTION, SOLUTION INTRAMUSCULAR; INTRAVENOUS at 18:04

## 2021-01-23 RX ADMIN — KETOROLAC TROMETHAMINE 15 MG: 30 INJECTION, SOLUTION INTRAMUSCULAR at 18:03

## 2021-01-23 ASSESSMENT — ENCOUNTER SYMPTOMS
SHORTNESS OF BREATH: 0
EYE ITCHING: 1
VOMITING: 1
ABDOMINAL PAIN: 0
SINUS PRESSURE: 1
NAUSEA: 1
COUGH: 0
SINUS PAIN: 1

## 2021-01-23 ASSESSMENT — PAIN SCALES - GENERAL
PAINLEVEL_OUTOF10: 4
PAINLEVEL_OUTOF10: 4

## 2021-01-23 NOTE — ED PROVIDER NOTES
ED Attending Attestation Note    This patient was seen by the advance practice provider. I have seen and examined the patient. I agree with the workup, evaluation, management, and diagnosis. The care plan has been discussed. My assessment reveals 48 y.o. female presenting with nausea, vomiting, abdominal cramping, dry mouth, dry eyes, poorly characterized chest discomfort. Abdominal exam soft, nontender, nondistended. EKG interpreted by myself. Rate: normal  Rhythm: Normal sinus with sinus arrhythmia   Axis: normal  Intervals: within normal limits  ST Segments: no acute abnormality  T waves: no acute abnormality  Comparison: no prior  Impression: Normal sinus with sinus arrhythmia        For further details of the patient's emergency department visit, please see the advanced practice provider's documentation. Ally Frazier MD     This report has been produced using speech recognition software and may contain errors related to that system including errors in grammar, punctuation, and spelling, as well as words and phrases that may be inappropriate. If there are any questions or concerns please feel free to contact the dictating provider for clarification.       Ally Frazier MD  01/23/21 [id number]

## 2021-01-23 NOTE — ED TRIAGE NOTES
Patient awake and alert, ambulatory to room 14, complains of dry skin, anxiety and rash on right hand.

## 2021-01-23 NOTE — ED PROVIDER NOTES
[address] ED  EMERGENCY DEPARTMENT ENCOUNTER        Pt Name: Antonella Castaneda  MRN: [id number]  [place] 1970  Date of evaluation: 1/23/2021  Provider: Faviola Barnes PA-C  PCP: Codey Leung MD    Shared Visit or Autonomous Visit:  I have seen and evaluated this patient with my supervising physician Johnny Lim MD.    [address]       Chief Complaint   Patient presents with    Illness     Patient c/o dry mouth, dry eye, nausea, right side mouth pain, headache. Patient states that she has not been eating and drinking because of panic attacks.  Anxiety       HISTORY OF PRESENT ILLNESS   (Location/Symptom, Timing/Onset, Context/Setting, Quality, Duration, Modifying Factors, Severity)  Note limiting factors. Antonella Castaneda is a 48 y.o. female presenting to the emergency department with complaint of body aches states her muscles hurt and cramping, general fatigue and weakness for the past 1 week. Has had nausea and vomiting. She had diarrhea 1 day that resolved. States her mouth is dry her skin is dry she feels dehydrated. States when she gets the chills and muscle cramps gets a tightness from around her back into her chest.  Denies feeling short of breath. Denies cough. No pain with a deep breath. No history of DVT or PE. No lower extremity swelling or calf pain. No recent surgeries or immobilizations. States also having irregular vaginal bleeding has a history of irregular periods did not have a period last month started bleeding this past week has been like a heavy period. Denies abdominal pain. Complains of dry skin itchy rash at her knuckles both hands. She had recently been started on Augmentin for a dental infection her right lower wisdom tooth states stopped taking it when her symptoms started thought maybe it was a reaction. States still has pain in her tooth. No facial swelling. Complains of pain in her sinuses and nose.   The history is provided by the patient. Fatigue  Onset quality:  Gradual  Duration:  1 week  Timing:  Constant  Progression:  Worsening  Chronicity:  New  Associated symptoms: chest pain, myalgias, nausea and vomiting    Associated symptoms: no abdominal pain, no cough, no dysuria, no fever, no shortness of breath and no syncope    Chest pain:     Quality: tightness      Onset quality:  Gradual    Duration:  1 week    Timing:  Intermittent    Progression:  Unchanged    Chronicity:  New  Risk factors: no congestive heart failure, no coronary artery disease and no diabetes          Nursing Notes were reviewed    REVIEW OF SYSTEMS    (2-9 systems for level 4, 10 or more for level 5)     Review of Systems   Constitutional: Positive for chills and fatigue. Negative for fever. HENT: Positive for congestion, dental problem, sinus pressure and sinus pain. Eyes: Positive for itching. Respiratory: Negative for cough and shortness of breath. Cardiovascular: Positive for chest pain. Negative for syncope. Gastrointestinal: Positive for nausea and vomiting. Negative for abdominal pain. Genitourinary: Positive for vaginal bleeding. Negative for difficulty urinating and dysuria. Musculoskeletal: Positive for myalgias. Skin: Positive for rash. All other systems reviewed and are negative. Positives and Pertinent negatives as per HPI.        PAST MEDICAL HISTORY     Past Medical History:   Diagnosis Date    Dental abscess          SURGICAL HISTORY     Past Surgical History:   Procedure Laterality Date    ANKLE SURGERY Left 2008    shattered ankle from a fall on ice    TONSILLECTOMY      TUBAL LIGATION  2001         [address]       Discharge Medication List as of 1/23/2021  9:03 PM      CONTINUE these medications which have NOT CHANGED    Details   acetaminophen (TYLENOL) 325 MG tablet Take 650 mg by mouth every 6 hours as needed for PainHistorical Med      amitriptyline (ELAVIL) 25 MG tablet Take 1 tablet by are normal. There is no distension. Palpations: Abdomen is soft. Abdomen is not rigid. There is no mass. Tenderness: There is no abdominal tenderness. There is no guarding or rebound. Musculoskeletal: Normal range of motion. Right lower leg: No edema. Left lower leg: No edema. Comments: No calf tenderness or leg swelling   Skin:     General: Skin is warm and dry. Comments: Mild erythematous dry skin rash at bilateral knuckles. No excoriations. No signs of infection. Neurological:      Mental Status: She is alert and oriented to person, place, and time. GCS: GCS eye subscore is 4. GCS verbal subscore is 5. GCS motor subscore is 6. Cranial Nerves: No cranial nerve deficit. Sensory: No sensory deficit. Motor: No abnormal muscle tone. Coordination: Coordination normal.   Psychiatric:         Mood and Affect: Mood is anxious.          Behavior: Behavior normal.         DIAGNOSTIC RESULTS   LABS:    Labs Reviewed   CBC WITH AUTO DIFFERENTIAL - Abnormal; Notable for the following components:       Result Value    WBC 12.3 (*)     Neutrophils Absolute 8.1 (*)     All other components within normal limits    Narrative:     Performed at:  [facility],  [place], [facility]   Phone [phone]   COMPREHENSIVE METABOLIC PANEL W/ REFLEX TO MG FOR LOW K - Abnormal; Notable for the following components:    Chloride 97 (*)     Glucose 106 (*)     BUN 6 (*)     ALT 59 (*)     AST 38 (*)     All other components within normal limits    Narrative:     Performed at:  [facility] - [facility],  [place], [facility]   Phone [phone]   URINE RT REFLEX TO CULTURE - Abnormal; Notable for the following components:    Blood, Urine LARGE (*)     All other components within normal limits    Narrative:     Performed at:  [facility]) - [facility], [place], [email or web address]   Phone [phone]   MICROSCOPIC URINALYSIS - Abnormal; Notable for the following components:    RBC, UA 21-50 (*)     All other components within normal limits    Narrative:     Performed at:  [facility] [address],  [place], [employer]   Phone [phone]   LACTIC ACID, PLASMA    Narrative:     Performed at:  [facility] [address],  [place], [place] [employer]   Phone [phone]   TROPONIN    Narrative:     Performed at:  [place] [address],  [place], [place] [facility]   Phone [phone]   PROCALCITONIN    Narrative:     Performed at:  [place] [address],  [place], [facility]   Phone [phone]   HCG, SERUM, QUALITATIVE    Narrative:     Performed at:  [place] [address],  [place], [email or web address]   Phone [phone]    Narrative:     Performed at:  [place] [address],  [place], [place] [facility] [employer] [facility]   Phone [phone]   MAGNESIUM    Narrative:     Performed at:  [place] [address],  [place] [employer]   Phone [phone]     Results for orders placed or performed during the hospital encounter of 01/23/21   Lactic Acid, Plasma   Result Value Ref Range    Lactic Acid 1.1 0.4 - 2.0 mmol/L   CBC Auto Differential   Result Value Ref Range    WBC 12.3 (H) 4.0 - 11.0 K/uL    RBC 4.34 4.00 - 5.20 M/uL    Hemoglobin 13.5 12.0 - 16.0 g/dL    Hematocrit 40.7 36.0 - 48.0 %    MCV 93.7 80.0 - 100.0 fL    MCH 31.1 26.0 - 34.0 pg    MCHC 33.2 31.0 - 36.0 g/dL    RDW 15.1 12.4 - 15.4 %    Platelets 622 908 - 915 K/uL    MPV 9.5 5.0 - 10.5 fL    Neutrophils % 65.9 %    Lymphocytes % 26.5 %    Monocytes % 5.7 %    Eosinophils % 0.6 %    Basophils % 1.3 %    Neutrophils Absolute 8.1 (H) 1.7 - 7.7 K/uL Lymphocytes Absolute 3.3 1.0 - 5.1 K/uL    Monocytes Absolute 0.7 0.0 - 1.3 K/uL    Eosinophils Absolute 0.1 0.0 - 0.6 K/uL    Basophils Absolute 0.2 0.0 - 0.2 K/uL   Comprehensive Metabolic Panel w/ Reflex to MG   Result Value Ref Range    Sodium 138 136 - 145 mmol/L    Potassium reflex Magnesium 3.5 3.5 - 5.1 mmol/L    Chloride 97 (L) 99 - 110 mmol/L    CO2 29 21 - 32 mmol/L    Anion Gap 12 3 - 16    Glucose 106 (H) 70 - 99 mg/dL    BUN 6 (L) 7 - 20 mg/dL    CREATININE 0.9 0.6 - 1.1 mg/dL    GFR Non-African American >60 >60    GFR African American >60 >60    Calcium 10.1 8.3 - 10.6 mg/dL    Total Protein 8.2 6.4 - 8.2 g/dL    Alb 4.9 3.4 - 5.0 g/dL    Albumin/Globulin Ratio 1.5 1.1 - 2.2    Total Bilirubin 0.6 0.0 - 1.0 mg/dL    Alkaline Phosphatase 96 40 - 129 U/L    ALT 59 (H) 10 - 40 U/L    AST 38 (H) 15 - 37 U/L    Globulin 3.3 g/dL   Troponin   Result Value Ref Range    Troponin <0.01 <0.01 ng/mL   Procalcitonin   Result Value Ref Range    Procalcitonin 0.07 0.00 - 0.15 ng/mL   HCG Qualitative, Serum   Result Value Ref Range    hCG Qual Negative Detects HCG level >10 MIU/mL   Urinalysis Reflex to Culture    Specimen: Urine, clean catch   Result Value Ref Range    Color, UA Yellow Straw/Yellow    Clarity, UA Clear Clear    Glucose, Ur Negative Negative mg/dL    Bilirubin Urine Negative Negative    Ketones, Urine Negative Negative mg/dL    Specific Gravity, UA <=1.005 1.005 - 1.030    Blood, Urine LARGE (A) Negative    pH, UA 6.0 5.0 - 8.0    Protein, UA Negative Negative mg/dL    Urobilinogen, Urine 0.2 <2.0 E.U./dL    Nitrite, Urine Negative Negative    Leukocyte Esterase, Urine Negative Negative    Microscopic Examination YES     Urine Type NotGiven     Urine Reflex to Culture Not Indicated    COVID-19   Result Value Ref Range    SARS-CoV-2, NAAT Not Detected Not Detected   Magnesium   Result Value Ref Range    Magnesium 2.20 1.80 - 2.40 mg/dL   Microscopic Urinalysis   Result Value Ref Range    WBC, UA 0-2 0 - 5 /HPF    RBC, UA 21-50 (A) 0 - 4 /HPF    Epithelial Cells, UA 0-1 0 - 5 /HPF   EKG 12 Lead   Result Value Ref Range    Ventricular Rate 61 BPM    Atrial Rate 61 BPM    P-R Interval 128 ms    QRS Duration 110 ms    Q-T Interval 442 ms    QTc Calculation (Bazett) 444 ms    P Axis 31 degrees    R Axis -22 degrees    T Axis 35 degrees    Diagnosis       Normal sinus rhythm with sinus arrhythmiaNormal ECGNo previous ECGs availableConfirmed by GUSTAVO SAUCEDA MD ([id number]) on 1/23/2021 8:55:24 PM       All other labs were within normal range or not returned as of this dictation. EKG: All EKG's are interpreted by the Emergency Department Physician in the absence of a cardiologist.  Please see their note for interpretation of EKG. RADIOLOGY:   Non-plain film images such as CT, Ultrasound and MRI are read by the radiologist. [facility] radiographic images are visualized andpreliminarily interpreted by the  ED Provider with the below findings:        Interpretation perthe Radiologist below, if available at the time of this note:    XR CHEST (2 VW)   Final Result   No radiographic evidence of acute cardiopulmonary disease. Xr Chest (2 Vw)    Result Date: 1/23/2021  EXAMINATION: TWO XRAY VIEWS OF THE CHEST 1/23/2021 5:22 pm COMPARISON: Chest 08/20/2016 HISTORY: ORDERING SYSTEM PROVIDED HISTORY: chest pain TECHNOLOGIST PROVIDED HISTORY: Reason for exam:-> chest pain Reason for Exam: Illness (Patient c/o dry mouth, dry eye, nausea, right side mouth pain, headache. Patient states that she has not been eating and drinking because of panic attacks.) Acuity: Unknown Type of Exam: Unknown FINDINGS: The cardiomediastinal and hilar silhouettes appear unremarkable. Chronic appearing coarse interstitial densities predominate perihilar regions and lung bases, typical of sequela from smoking or other previous infectious/inflammatory process. The lungs appear clear. No pleural effusion evident. No pneumothorax is seen.  No acute osseous abnormality is identified. No radiographic evidence of acute cardiopulmonary disease. PROCEDURES   Unless otherwise noted below, none     Procedures    CRITICAL CARE TIME   N/A    CONSULTS:  None      EMERGENCY DEPARTMENT COURSE and DIFFERENTIAL DIAGNOSIS/MDM:   Vitals:    Vitals:    01/23/21 1624 01/23/21 2033   BP: (!) 140/82 114/66   Pulse: 63 73   Resp: 16 20   Temp: 97.6 °F (36.4 °C)    TempSrc: Oral    SpO2: 99% 98%   Weight: 180 lb (81.6 kg)    Height: 5' 2.5\" (1.588 m)        Patient was given thefollowing medications:  Medications   ondansetron (ZOFRAN) injection 4 mg (4 mg Intravenous Given 1/23/21 1807)   0.9 % sodium chloride bolus (0 mLs Intravenous Stopped 1/23/21 1958)   ketorolac (TORADOL) injection 15 mg (15 mg Intravenous Given 1/23/21 1803)   diphenhydrAMINE (BENADRYL) injection 12.5 mg (12.5 mg Intravenous Given 1/23/21 1804)     HEART SCORE:        0-3 Adverse outcome risk: 2.5% (very low to low risk) Supports early discharge with appropriate follow-up   4-6 Adverse outcome risk: 20.3% (moderate risk) Supports admission with standard rule-out management and stress testing   7-10 Adverse outcome risk: 72.7% (high to very high risk) Risk in first 30 days >50% Supports early aggressive management and typically with cardiac catheterization       Heart score: 2, age, risk factors obesity and former smoker. This falls under the following category: Score of 0-3, which indicates a very low risk for major adverse cardiac event and supports early discharge    9:02 PM EST  Patient is feeling better after medications given here. Has overall improved appearance. No significant abnormality on work-up here. EKG showing sinus rhythm no acute ischemia. Troponin is normal.  Chest pain is atypical, not exertional, do not suspect acute coronary syndrome. Chest x-ray no signs of pneumonia. COVID-19 is negative. No significant electrolyte abnormality.   On urinalysis microscopic hematuria suspect this is contamination from her menses, no white cells to suggest urinary tract infection. Patient also has dental pain right lower wisdom tooth but there are no signs of infection on exam today. She has stopped her Augmentin she thought this may be causing her symptoms I discussed with her I will hold off on starting another antibiotic at this time she is in agreement. She was prescribed Flonase and Claritin for her sinus symptoms. Zofran as needed for nausea. I discussed with her close follow-up with primary care as well as dentist, she has contacted the dentist she is waiting on return call for appointment. Advise returning to the ER for any worsening or changes. She understands and agrees. I estimate there is LOW risk for PULMONARY EMBOLISM, ACUTE CORONARY SYNDROME, OR THORACIC AORTIC DISSECTION, thus I consider the discharge disposition reasonable. I estimate there is LOW risk for PNEUMONIA, MENINGITIS, PERITONSILLAR ABSCESS, SEPSIS, MALIGNANT OTITIS EXTERNA, OR EPIGLOTTITIS thus I consider the discharge disposition reasonable. I estimate there is LOW risk for a DEEP SPACE INFECTION (e.g., GORDYS ANGINA OR RETROPHARYNGEAL ABSCESS), MENINGITIS, or AIRWAY COMPROMISE, thus I consider the discharge disposition reasonable. Also, there is no evidence or peritonitis, sepsis, or toxicity. FINAL IMPRESSION      1. Other fatigue    2. Body aches    3. Nasal congestion    4. Chest pain, unspecified type    5. Nausea    6.  Odontalgia          DISPOSITION/PLAN   DISPOSITION     PATIENT REFERREDTO:  Santino Roberson MD  [address] [place] [address]  [phone]    Schedule an appointment as soon as possible for a visit       Dentist    Schedule an appointment as soon as possible for a visit       [facility] PHYSICAL [facility] ED  [address]  [phone]    If symptoms worsen      DISCHARGE MEDICATIONS:  Discharge Medication List as of 1/23/2021  9:03 PM START taking these medications    Details   loratadine (CLARITIN) 10 MG tablet Take 1 tablet by mouth daily, Disp-20 tablet, R-0Print      fluticasone (FLONASE) 50 MCG/ACT nasal spray 1 spray by Each Nostril route daily, Disp-1 Bottle, R-0Print      ondansetron (ZOFRAN) 4 MG tablet Take 1 tablet by mouth every 8 hours as needed for Nausea, Disp-10 tablet, R-0Print             DISCONTINUED MEDICATIONS:  Discharge Medication List as of 1/23/2021  9:03 PM                 (Please note that portions ofthis note were completed with a voice recognition program.  Efforts were made to edit the dictations but occasionally words are mis-transcribed.)    Lorrie Gordillo PA-C (electronically signed)            Racine BALBINA Caro  01/23/21 [id number]

## 2021-01-24 NOTE — ED NOTES
Verbal report given to [employer]. No questions at this time.      Alvaro Acevedo, RN  01/23/21 2055

## 2021-02-09 ENCOUNTER — VIRTUAL VISIT (OUTPATIENT)
Dept: INTERNAL MEDICINE CLINIC | Age: 51
End: 2021-02-09
Payer: MEDICAID

## 2021-02-09 DIAGNOSIS — R51.9 HEADACHE DISORDER: Primary | ICD-10-CM

## 2021-02-09 PROCEDURE — 3017F COLORECTAL CA SCREEN DOC REV: CPT | Performed by: INTERNAL MEDICINE

## 2021-02-09 PROCEDURE — 99212 OFFICE O/P EST SF 10 MIN: CPT | Performed by: INTERNAL MEDICINE

## 2021-02-09 PROCEDURE — G8427 DOCREV CUR MEDS BY ELIG CLIN: HCPCS | Performed by: INTERNAL MEDICINE

## 2021-02-09 NOTE — PROGRESS NOTES
Date of Service:  2/9/2021    Chief Complaint:      Chief Complaint   Patient presents with    Headache       HPI:  Jero Lopez is a 48 y.o. Pursuant to the emergency declaration under the [facility] [place] [address] waiver authority and the [employer] Act, this Virtual  Video Visit was conducted, with patient's consent, to reduce the patient's risk of exposure to COVID-19 and provide continuity of care. Service is  provided through a video synchronous discussion virtually to substitute for in-person clinic visit with the patient being at home and Dr. Gordon Rodriguez being at home. She has a little congestion and works wants her to go get tested for covid 19. Headaches:  Resolve when she's around steam so she thinks it's due to sinus and didn't start on the elavil, imitrex or prednisone. It's been daily lasting about 20 mins and goes away with steam.  She's still off caffeine.     Lab Results   Component Value Date    LABMICR YES 01/23/2021     Lab Results   Component Value Date     01/23/2021    K 3.5 01/23/2021    CL 97 (L) 01/23/2021    CO2 29 01/23/2021    BUN 6 (L) 01/23/2021    CREATININE 0.9 01/23/2021    GLUCOSE 106 (H) 01/23/2021    CALCIUM 10.1 01/23/2021     No results found for: CHOL, TRIG, HDL, LDLCALC, LDLDIRECT  Lab Results   Component Value Date    ALT 59 (H) 01/23/2021    AST 38 (H) 01/23/2021     No results found for: TSH, T4FREE  Lab Results   Component Value Date    WBC 12.3 (H) 01/23/2021    HGB 13.5 01/23/2021    HCT 40.7 01/23/2021    MCV 93.7 01/23/2021     01/23/2021     Lab Results   Component Value Date    INR 0.96 11/27/2015      No results found for: PSA   No results found for: LABURIC     Patient Active Problem List   Diagnosis    Mixed stress and urge urinary incontinence    Chronic wrist pain, left       Allergies   Allergen Reactions    Aspirin Swelling    Bee Venom Swelling  Lactose Intolerance (Gi)      Outpatient Medications Marked as Taking for the 2/9/21 encounter (Virtual Visit) with Olegario Amaya MD   Medication Sig Dispense Refill    loratadine (CLARITIN) 10 MG tablet Take 1 tablet by mouth daily 20 tablet 0    fluticasone (FLONASE) 50 MCG/ACT nasal spray 1 spray by Each Nostril route daily 1 Bottle 0    ondansetron (ZOFRAN) 4 MG tablet Take 1 tablet by mouth every 8 hours as needed for Nausea 10 tablet 0    amitriptyline (ELAVIL) 25 MG tablet Take 1 tablet by mouth nightly 30 tablet 0    predniSONE (DELTASONE) 20 MG tablet Take 3 pills in the AM for 3 9 tablet 0    acetaminophen (TYLENOL) 325 MG tablet Take 650 mg by mouth every 6 hours as needed for Pain           Review of Systems: 14 systems were negative except of what was stated on HPI    Nursing note and vitals reviewed. There were no vitals filed for this visit. Wt Readings from Last 3 Encounters:   01/23/21 180 lb (81.6 kg)   01/20/21 180 lb (81.6 kg)   01/16/21 165 lb (74.8 kg)     BP Readings from Last 3 Encounters:   01/23/21 114/66   01/20/21 132/82   01/16/21 137/79     There is no height or weight on file to calculate BMI. Constitutional: Patient appears well-developed and well-nourished. No distress. Head: Normocephalic and atraumatic. Skin: No rash or erythema. Psychiatric: Normal mood and affect. Behavior is normal.       Assessment/Plan:  Kezia Nelson was seen today for headache. Diagnoses and all orders for this visit:    Headache disorder    resolve with humidifier    Pt to schedule with [facility] for drive through test for covid    Return keep Physical 9/9.

## 2021-02-10 ENCOUNTER — OFFICE VISIT (OUTPATIENT)
Dept: PRIMARY CARE CLINIC | Age: 51
End: 2021-02-10
Payer: MEDICAID

## 2021-02-10 DIAGNOSIS — Z11.59 SCREENING FOR VIRAL DISEASE: Primary | ICD-10-CM

## 2021-02-10 PROCEDURE — G8428 CUR MEDS NOT DOCUMENT: HCPCS | Performed by: NURSE PRACTITIONER

## 2021-02-10 PROCEDURE — 99211 OFF/OP EST MAY X REQ PHY/QHP: CPT | Performed by: NURSE PRACTITIONER

## 2021-02-10 PROCEDURE — G8417 CALC BMI ABV UP PARAM F/U: HCPCS | Performed by: NURSE PRACTITIONER

## 2021-02-10 NOTE — PATIENT INSTRUCTIONS

## 2021-02-10 NOTE — PROGRESS NOTES
Mey Mitchell received a viral test for COVID-19. They were educated on isolation and quarantine as appropriate. For any symptoms, they were directed to seek care from their PCP, given contact information to establish with a doctor, directed to an urgent care or the emergency room.

## 2021-02-11 LAB — SARS-COV-2, PCR: DETECTED

## 2021-02-17 ENCOUNTER — OFFICE VISIT (OUTPATIENT)
Dept: PRIMARY CARE CLINIC | Age: 51
End: 2021-02-17
Payer: MEDICAID

## 2021-02-17 DIAGNOSIS — Z11.59 SCREENING FOR VIRAL DISEASE: Primary | ICD-10-CM

## 2021-02-17 PROCEDURE — G8428 CUR MEDS NOT DOCUMENT: HCPCS | Performed by: NURSE PRACTITIONER

## 2021-02-17 PROCEDURE — G8417 CALC BMI ABV UP PARAM F/U: HCPCS | Performed by: NURSE PRACTITIONER

## 2021-02-17 PROCEDURE — 99211 OFF/OP EST MAY X REQ PHY/QHP: CPT | Performed by: NURSE PRACTITIONER

## 2021-02-17 NOTE — PATIENT INSTRUCTIONS
Advance Care Planning  People with COVID-19 may have no symptoms, mild symptoms, such as fever, cough, and shortness of breath or they may have more severe illness, developing severe and fatal pneumonia. As a result, Advance Care Planning with attention to naming a health care decision maker (someone you trust to make healthcare decisions for you if you could not speak for yourself) and sharing other health care preferences is important BEFORE a possible health crisis. Please contact your Primary Care Provider to discuss Advance Care Planning. Preventing the Spread of Coronavirus Disease 2019 in Homes and Residential Communities  For the most recent information go to [email or web address]    Prevention steps for People with confirmed or suspected COVID-19 (including persons under investigation) who do not need to be hospitalized  and   People with confirmed COVID-19 who were hospitalized and determined to be medically stable to go home    Your healthcare provider and public health staff will evaluate whether you can be cared for at home. If it is determined that you do not need to be hospitalized and can be isolated at home, you will be monitored by staff from your local or state health department. You should follow the prevention steps below until a healthcare provider or local or state health department says you can return to your normal activities. Stay home except to get medical care  People who are mildly ill with COVID-19 are able to isolate at home during their illness. You should restrict activities outside your home, except for getting medical care. Do not go to work, school, or public areas. Avoid using public transportation, ride-sharing, or taxis.   Separate yourself from other people and animals in your home People: As much as possible, you should stay in a specific room and away from other people in your home. Also, you should use a separate bathroom, if available. Animals: You should restrict contact with pets and other animals while you are sick with COVID-19, just like you would around other people. Although there have not been reports of pets or other animals becoming sick with COVID-19, it is still recommended that people sick with COVID-19 limit contact with animals until more information is known about the virus. When possible, have another member of your household care for your animals while you are sick. If you are sick with COVID-19, avoid contact with your pet, including petting, snuggling, being kissed or licked, and sharing food. If you must care for your pet or be around animals while you are sick, wash your hands before and after you interact with pets and wear a facemask. Call ahead before visiting your doctor  If you have a medical appointment, call the healthcare provider and tell them that you have or may have COVID-19. This will help the healthcare providers office take steps to keep other people from getting infected or exposed. Wear a facemask  You should wear a facemask when you are around other people (e.g., sharing a room or vehicle) or pets and before you enter a healthcare providers office. If you are not able to wear a facemask (for example, because it causes trouble breathing), then people who live with you should not stay in the same room with you, or they should wear a facemask if they enter your room. Cover your coughs and sneezes  Cover your mouth and nose with a tissue when you cough or sneeze. Throw used tissues in a lined trash can. Immediately wash your hands with soap and water for at least 20 seconds or, if soap and water are not available, clean your hands with an alcohol-based hand  that contains at least 60% alcohol.   Clean your hands often Seek prompt medical attention if your illness is worsening (e.g., difficulty breathing). Before seeking care, call your healthcare provider and tell them that you have, or are being evaluated for, COVID-19. Put on a facemask before you enter the facility. These steps will help the healthcare providers office to keep other people in the office or waiting room from getting infected or exposed. Ask your healthcare provider to call the local or state health department. Persons who are placed under active monitoring or facilitated self-monitoring should follow instructions provided by their local health department or occupational health professionals, as appropriate. When working with your local health department check their available hours. If you have a medical emergency and need to call 911, notify the dispatch personnel that you have, or are being evaluated for COVID-19. If possible, put on a facemask before emergency medical services arrive. Discontinuing home isolation  Patients with confirmed COVID-19 should remain under home isolation precautions until the risk of secondary transmission to others is thought to be low. The decision to discontinue home isolation precautions should be made on a case-by-case basis, in consultation with healthcare providers and state and local health departments.

## 2021-02-18 LAB — SARS-COV-2, NAA: DETECTED

## 2021-02-23 ENCOUNTER — OFFICE VISIT (OUTPATIENT)
Dept: PRIMARY CARE CLINIC | Age: 51
End: 2021-02-23
Payer: MEDICAID

## 2021-02-23 DIAGNOSIS — Z11.59 SCREENING FOR VIRAL DISEASE: Primary | ICD-10-CM

## 2021-02-23 PROCEDURE — G8428 CUR MEDS NOT DOCUMENT: HCPCS | Performed by: NURSE PRACTITIONER

## 2021-02-23 PROCEDURE — G8417 CALC BMI ABV UP PARAM F/U: HCPCS | Performed by: NURSE PRACTITIONER

## 2021-02-23 PROCEDURE — 99211 OFF/OP EST MAY X REQ PHY/QHP: CPT | Performed by: NURSE PRACTITIONER

## 2021-02-23 NOTE — PATIENT INSTRUCTIONS

## 2021-02-24 LAB — SARS-COV-2: DETECTED

## 2021-03-03 ENCOUNTER — OFFICE VISIT (OUTPATIENT)
Dept: PRIMARY CARE CLINIC | Age: 51
End: 2021-03-03
Payer: MEDICAID

## 2021-03-03 DIAGNOSIS — Z11.59 SCREENING FOR VIRAL DISEASE: Primary | ICD-10-CM

## 2021-03-03 PROCEDURE — 99211 OFF/OP EST MAY X REQ PHY/QHP: CPT | Performed by: NURSE PRACTITIONER

## 2021-03-03 PROCEDURE — G8417 CALC BMI ABV UP PARAM F/U: HCPCS | Performed by: NURSE PRACTITIONER

## 2021-03-03 PROCEDURE — G8428 CUR MEDS NOT DOCUMENT: HCPCS | Performed by: NURSE PRACTITIONER

## 2021-03-03 NOTE — PATIENT INSTRUCTIONS
Advance Care Planning  People with COVID-19 may have no symptoms, mild symptoms, such as fever, cough, and shortness of breath or they may have more severe illness, developing severe and fatal pneumonia. As a result, Advance Care Planning with attention to naming a health care decision maker (someone you trust to make healthcare decisions for you if you could not speak for yourself) and sharing other health care preferences is important BEFORE a possible health crisis. Please contact your Primary Care Provider to discuss Advance Care Planning. Preventing the Spread of Coronavirus Disease 2019 in Homes and Residential Communities  For the most recent information go to [email or web address]    Prevention steps for People with confirmed or suspected COVID-19 (including persons under investigation) who do not need to be hospitalized  and   People with confirmed COVID-19 who were hospitalized and determined to be medically stable to go home    Your healthcare provider and public health staff will evaluate whether you can be cared for at home. If it is determined that you do not need to be hospitalized and can be isolated at home, you will be monitored by staff from your local or state health department. You should follow the prevention steps below until a healthcare provider or local or state health department says you can return to your normal activities. Stay home except to get medical care  People who are mildly ill with COVID-19 are able to isolate at home during their illness. You should restrict activities outside your home, except for getting medical care. Do not go to work, school, or public areas. Avoid using public transportation, ride-sharing, or taxis.   Separate yourself from other people and animals in your home Wash your hands often with soap and water for at least 20 seconds, especially after blowing your nose, coughing, or sneezing; going to the bathroom; and before eating or preparing food. If soap and water are not readily available, use an alcohol-based hand  with at least 60% alcohol, covering all surfaces of your hands and rubbing them together until they feel dry. Soap and water are the best option if hands are visibly dirty. Avoid touching your eyes, nose, and mouth with unwashed hands. Avoid sharing personal household items  You should not share dishes, drinking glasses, cups, eating utensils, towels, or bedding with other people or pets in your home. After using these items, they should be washed thoroughly with soap and water. Clean all high-touch surfaces everyday  High touch surfaces include counters, tabletops, doorknobs, bathroom fixtures, toilets, phones, keyboards, tablets, and bedside tables. Also, clean any surfaces that may have blood, stool, or body fluids on them. Use a household cleaning spray or wipe, according to the label instructions. Labels contain instructions for safe and effective use of the cleaning product including precautions you should take when applying the product, such as wearing gloves and making sure you have good ventilation during use of the product.   Monitor your symptoms Seek prompt medical attention if your illness is worsening (e.g., difficulty breathing). Before seeking care, call your healthcare provider and tell them that you have, or are being evaluated for, COVID-19. Put on a facemask before you enter the facility. These steps will help the healthcare providers office to keep other people in the office or waiting room from getting infected or exposed. Ask your healthcare provider to call the local or state health department. Persons who are placed under active monitoring or facilitated self-monitoring should follow instructions provided by their local health department or occupational health professionals, as appropriate. When working with your local health department check their available hours. If you have a medical emergency and need to call 911, notify the dispatch personnel that you have, or are being evaluated for COVID-19. If possible, put on a facemask before emergency medical services arrive. Discontinuing home isolation  Patients with confirmed COVID-19 should remain under home isolation precautions until the risk of secondary transmission to others is thought to be low. The decision to discontinue home isolation precautions should be made on a case-by-case basis, in consultation with healthcare providers and state and local health departments.

## 2021-03-04 LAB — SARS-COV-2: NOT DETECTED

## 2021-03-07 ENCOUNTER — HOSPITAL ENCOUNTER (EMERGENCY)
Age: 51
Discharge: HOME | End: 2021-03-07
Attending: STUDENT IN AN ORGANIZED HEALTH CARE EDUCATION/TRAINING PROGRAM
Payer: MEDICAID

## 2021-03-07 ENCOUNTER — APPOINTMENT (OUTPATIENT)
Dept: GENERAL RADIOLOGY | Age: 51
End: 2021-03-07
Payer: MEDICAID

## 2021-03-07 VITALS
OXYGEN SATURATION: 97 % | HEART RATE: 60 BPM | TEMPERATURE: 98.1 F | RESPIRATION RATE: 16 BRPM | SYSTOLIC BLOOD PRESSURE: 113 MMHG | DIASTOLIC BLOOD PRESSURE: 67 MMHG

## 2021-03-07 DIAGNOSIS — R00.2 PALPITATIONS: ICD-10-CM

## 2021-03-07 DIAGNOSIS — R11.2 NON-INTRACTABLE VOMITING WITH NAUSEA, UNSPECIFIED VOMITING TYPE: ICD-10-CM

## 2021-03-07 DIAGNOSIS — R51.9 SINUS HEADACHE: Primary | ICD-10-CM

## 2021-03-07 DIAGNOSIS — K08.89 PAIN, DENTAL: ICD-10-CM

## 2021-03-07 DIAGNOSIS — E87.6 HYPOKALEMIA: ICD-10-CM

## 2021-03-07 LAB
A/G RATIO: 1.4 (ref 1.1–2.2)
ALBUMIN SERPL-MCNC: 5 G/DL (ref 3.4–5)
ALP BLD-CCNC: 101 U/L (ref 40–129)
ALT SERPL-CCNC: 34 U/L (ref 10–40)
ANION GAP SERPL CALCULATED.3IONS-SCNC: 11 MMOL/L (ref 3–16)
AST SERPL-CCNC: 21 U/L (ref 15–37)
BASOPHILS ABSOLUTE: 0.1 K/UL (ref 0–0.2)
BASOPHILS RELATIVE PERCENT: 0.9 %
BILIRUB SERPL-MCNC: 0.6 MG/DL (ref 0–1)
BUN BLDV-MCNC: 7 MG/DL (ref 7–20)
CALCIUM SERPL-MCNC: 10.2 MG/DL (ref 8.3–10.6)
CHLORIDE BLD-SCNC: 98 MMOL/L (ref 99–110)
CO2: 28 MMOL/L (ref 21–32)
CREAT SERPL-MCNC: 0.7 MG/DL (ref 0.6–1.1)
EOSINOPHILS ABSOLUTE: 0.1 K/UL (ref 0–0.6)
EOSINOPHILS RELATIVE PERCENT: 1.3 %
GFR AFRICAN AMERICAN: >60
GFR NON-AFRICAN AMERICAN: >60
GLOBULIN: 3.7 G/DL
GLUCOSE BLD-MCNC: 155 MG/DL (ref 70–99)
HCT VFR BLD CALC: 44.2 % (ref 36–48)
HEMOGLOBIN: 14.7 G/DL (ref 12–16)
LYMPHOCYTES ABSOLUTE: 1.6 K/UL (ref 1–5.1)
LYMPHOCYTES RELATIVE PERCENT: 19.1 %
MAGNESIUM: 2.1 MG/DL (ref 1.8–2.4)
MCH RBC QN AUTO: 30.8 PG (ref 26–34)
MCHC RBC AUTO-ENTMCNC: 33.3 G/DL (ref 31–36)
MCV RBC AUTO: 92.4 FL (ref 80–100)
MONOCYTES ABSOLUTE: 0.5 K/UL (ref 0–1.3)
MONOCYTES RELATIVE PERCENT: 5.3 %
NEUTROPHILS ABSOLUTE: 6.3 K/UL (ref 1.7–7.7)
NEUTROPHILS RELATIVE PERCENT: 73.4 %
PDW BLD-RTO: 13.6 % (ref 12.4–15.4)
PLATELET # BLD: 359 K/UL (ref 135–450)
PMV BLD AUTO: 10.2 FL (ref 5–10.5)
POTASSIUM REFLEX MAGNESIUM: 3.3 MMOL/L (ref 3.5–5.1)
RBC # BLD: 4.78 M/UL (ref 4–5.2)
SODIUM BLD-SCNC: 137 MMOL/L (ref 136–145)
TOTAL PROTEIN: 8.7 G/DL (ref 6.4–8.2)
TROPONIN: <0.01 NG/ML
WBC # BLD: 8.6 K/UL (ref 4–11)

## 2021-03-07 PROCEDURE — 99284 EMERGENCY DEPT VISIT MOD MDM: CPT

## 2021-03-07 PROCEDURE — 6370000000 HC RX 637 (ALT 250 FOR IP): Performed by: STUDENT IN AN ORGANIZED HEALTH CARE EDUCATION/TRAINING PROGRAM

## 2021-03-07 PROCEDURE — 93005 ELECTROCARDIOGRAM TRACING: CPT | Performed by: STUDENT IN AN ORGANIZED HEALTH CARE EDUCATION/TRAINING PROGRAM

## 2021-03-07 PROCEDURE — 80053 COMPREHEN METABOLIC PANEL: CPT

## 2021-03-07 PROCEDURE — 83735 ASSAY OF MAGNESIUM: CPT

## 2021-03-07 PROCEDURE — 84484 ASSAY OF TROPONIN QUANT: CPT

## 2021-03-07 PROCEDURE — 71045 X-RAY EXAM CHEST 1 VIEW: CPT

## 2021-03-07 PROCEDURE — 85025 COMPLETE CBC W/AUTO DIFF WBC: CPT

## 2021-03-07 RX ORDER — ONDANSETRON 4 MG/1
4 TABLET, ORALLY DISINTEGRATING ORAL ONCE
Status: COMPLETED | OUTPATIENT
Start: 2021-03-07 | End: 2021-03-07

## 2021-03-07 RX ORDER — ACETAMINOPHEN 325 MG/1
650 TABLET ORAL ONCE
Status: COMPLETED | OUTPATIENT
Start: 2021-03-07 | End: 2021-03-07

## 2021-03-07 RX ORDER — PENICILLIN V POTASSIUM 500 MG/1
500 TABLET ORAL 4 TIMES DAILY
Qty: 28 TABLET | Refills: 0 | Status: SHIPPED | OUTPATIENT
Start: 2021-03-07 | End: 2021-03-14

## 2021-03-07 RX ORDER — PENICILLIN V POTASSIUM 250 MG/1
500 TABLET ORAL ONCE
Status: COMPLETED | OUTPATIENT
Start: 2021-03-07 | End: 2021-03-07

## 2021-03-07 RX ORDER — ONDANSETRON 4 MG/1
4 TABLET, ORALLY DISINTEGRATING ORAL EVERY 8 HOURS PRN
Qty: 9 TABLET | Refills: 0 | Status: SHIPPED | OUTPATIENT
Start: 2021-03-07 | End: 2021-03-10

## 2021-03-07 RX ADMIN — ONDANSETRON 4 MG: 4 TABLET, ORALLY DISINTEGRATING ORAL at 09:17

## 2021-03-07 RX ADMIN — ACETAMINOPHEN 650 MG: 325 TABLET ORAL at 09:17

## 2021-03-07 RX ADMIN — PENICILLIN V POTASIUM 500 MG: 250 TABLET ORAL at 09:17

## 2021-03-07 RX ADMIN — POTASSIUM BICARBONATE 40 MEQ: 782 TABLET, EFFERVESCENT ORAL at 11:06

## 2021-03-07 ASSESSMENT — PAIN DESCRIPTION - PAIN TYPE: TYPE: ACUTE PAIN

## 2021-03-07 ASSESSMENT — PAIN SCALES - GENERAL: PAINLEVEL_OUTOF10: 8

## 2021-03-07 NOTE — LETTER
[facility] PHYSICAL REHABILIT[facility] [place] [facility]  [address] [address]  Phone: [phone]               March 7, 2021    Patient: Hans Rape   YOB: 1970   Date of Visit: 3/7/2021       To Whom It May Concern:    Jestine Goltz was seen and treated in our emergency department on 3/7/2021. She can return to work on 3/9/2021.       Sincerely,       Brandon Kaur RN         Signature:__________________________________

## 2021-03-07 NOTE — ED PROVIDER NOTES
[address] [facility]      CHIEF COMPLAINT  Headache (headache, nausea, sinus pressure, left ear pain, dental pain started yesterday)       HISTORY OF PRESENT ILLNESS  Nuria Arellano is a 48 y.o. female  who presents to the ED complaining of multiple complaints. Patient states that she has had nausea and 2 episodes of nonbloody emesis since yesterday morning. She also states that she is having some pressure in her forehead/sinuses, and some dental pain in her left lower jaw that all started yesterday. She denies any associated fevers. She states that she vomited one episode yesterday and vomited once this morning but it was nonbloody. She denies any difficulty swallowing. She denies associated fevers. Denies any cough or shortness of breath, abdominal pain, dysuria hematuria, chest pain, or other complaints or concerns at this time. She states that she took 1 Tylenol yesterday for her symptoms but that she vomited it back up. She has not taken anything else. She denies any vision changes. Denies any other complaints or concerns. No other complaints, modifying factors or associated symptoms. I have reviewed the following from the nursing documentation.     Past Medical History:   Diagnosis Date    Dental abscess      Past Surgical History:   Procedure Laterality Date    ANKLE SURGERY Left 2008    shattered ankle from a fall on ice    TONSILLECTOMY      TUBAL LIGATION  2001     Family History   Problem Relation Age of Onset    Heart Attack Mother 55    High Cholesterol Mother     Early Death Mother 55    Heart Disease Mother         CABG    High Cholesterol Father     Pancreatic Cancer Father     Cancer Maternal Grandmother         uterine      Social History     Socioeconomic History    Marital status:      Spouse name: Not on file    Number of children: Not on file    Years of education: Not on file    Highest education level: Not on file   Occupational History    Not on file   Social Needs    Financial resource strain: Not on file    Food insecurity     Worry: Not on file     Inability: Not on file    Transportation needs     Medical: Not on file     Non-medical: Not on file   Tobacco Use    Smoking status: Former Smoker     Quit date: 02/2018     Years since quitting: 3.0    Smokeless tobacco: Never Used    Tobacco comment: Feb 2018   Substance and Sexual Activity    Alcohol use: Yes     Comment: rarely    Drug use: No    Sexual activity: Yes   Lifestyle    Physical activity     Days per week: Not on file     Minutes per session: Not on file    Stress: Not on file   Relationships    Social connections     Talks on phone: Not on file     Gets together: Not on file     Attends Hindu service: Not on file     Active member of club or organization: Not on file     Attends meetings of clubs or organizations: Not on file     Relationship status: Not on file    Intimate partner violence     Fear of current or ex partner: Not on file     Emotionally abused: Not on file     Physically abused: Not on file     Forced sexual activity: Not on file   Other Topics Concern    Not on file   Social History Narrative    Not on file     No current facility-administered medications for this encounter.       Current Outpatient Medications   Medication Sig Dispense Refill    penicillin v potassium (VEETID) 500 MG tablet Take 1 tablet by mouth 4 times daily for 7 days 28 tablet 0    ondansetron (ZOFRAN ODT) 4 MG disintegrating tablet Take 1 tablet by mouth every 8 hours as needed for Nausea or Vomiting 9 tablet 0    loratadine (CLARITIN) 10 MG tablet Take 1 tablet by mouth daily 20 tablet 0    fluticasone (FLONASE) 50 MCG/ACT nasal spray 1 spray by Each Nostril route daily 1 Bottle 0    acetaminophen (TYLENOL) 325 MG tablet Take 650 mg by mouth every 6 hours as needed for Pain       Allergies   Allergen Reactions    Aspirin Swelling    Bee Venom Swelling    Lactose Intolerance (Gi)        REVIEW OF SYSTEMS  10 systems reviewed, pertinent positives per HPI otherwise noted to be negative. PHYSICAL EXAM  /67   Pulse 60   Temp 98.1 °F (36.7 °C) (Oral)   Resp 16   LMP 03/07/2021   SpO2 97%    GENERAL APPEARANCE: Awake and alert. Cooperative. No acute distress. HENT: Normocephalic. Atraumatic. Mucous membranes are moist.  She has a broken left lower wisdom tooth, no surrounding erythema or evidence of abscess. TMs clear bilaterally. No submandibular swelling. NECK: Supple. No neck stiffness or meningismus. No neck swelling or edema. Lymphadenopathy. EYES: PERRL. EOM's grossly intact. HEART/CHEST: RRR. No murmurs. LUNGS: Respirations unlabored. CTAB. Good air exchange. Speaking comfortably in full sentences. ABDOMEN: No tenderness. Soft. Non-distended. No masses. No organomegaly. No guarding or rebound. MUSCULOSKELETAL: No extremity edema. Compartments soft. No deformity. No tenderness in the extremities. All extremities neurovascularly intact. SKIN: Warm and dry. No acute rashes. NEUROLOGICAL: Alert and oriented. CN's 2-12 intact. No gross facial drooping. Strength 5/5, sensation intact. PSYCHIATRIC: Anxious    LABS  I have reviewed all labs for this visit.    Results for orders placed or performed during the hospital encounter of 03/07/21   CBC auto differential   Result Value Ref Range    WBC 8.6 4.0 - 11.0 K/uL    RBC 4.78 4.00 - 5.20 M/uL    Hemoglobin 14.7 12.0 - 16.0 g/dL    Hematocrit 44.2 36.0 - 48.0 %    MCV 92.4 80.0 - 100.0 fL    MCH 30.8 26.0 - 34.0 pg    MCHC 33.3 31.0 - 36.0 g/dL    RDW 13.6 12.4 - 15.4 %    Platelets 811 788 - 429 K/uL    MPV 10.2 5.0 - 10.5 fL    Neutrophils % 73.4 %    Lymphocytes % 19.1 %    Monocytes % 5.3 %    Eosinophils % 1.3 %    Basophils % 0.9 %    Neutrophils Absolute 6.3 1.7 - 7.7 K/uL    Lymphocytes Absolute 1.6 1.0 - 5.1 K/uL    Monocytes Absolute 0.5 0.0 - 1.3 K/uL    Eosinophils Absolute 0.1 0.0 - 0.6 K/uL Basophils Absolute 0.1 0.0 - 0.2 K/uL   Troponin   Result Value Ref Range    Troponin <0.01 <0.01 ng/mL   Comprehensive Metabolic Panel w/ Reflex to MG   Result Value Ref Range    Sodium 137 136 - 145 mmol/L    Potassium reflex Magnesium 3.3 (L) 3.5 - 5.1 mmol/L    Chloride 98 (L) 99 - 110 mmol/L    CO2 28 21 - 32 mmol/L    Anion Gap 11 3 - 16    Glucose 155 (H) 70 - 99 mg/dL    BUN 7 7 - 20 mg/dL    CREATININE 0.7 0.6 - 1.1 mg/dL    GFR Non-African American >60 >60    GFR African American >60 >60    Calcium 10.2 8.3 - 10.6 mg/dL    Total Protein 8.7 (H) 6.4 - 8.2 g/dL    Albumin 5.0 3.4 - 5.0 g/dL    Albumin/Globulin Ratio 1.4 1.1 - 2.2    Total Bilirubin 0.6 0.0 - 1.0 mg/dL    Alkaline Phosphatase 101 40 - 129 U/L    ALT 34 10 - 40 U/L    AST 21 15 - 37 U/L    Globulin 3.7 g/dL   Magnesium   Result Value Ref Range    Magnesium 2.10 1.80 - 2.40 mg/dL   EKG 12 Lead   Result Value Ref Range    Ventricular Rate 56 BPM    Atrial Rate 56 BPM    P-R Interval 118 ms    QRS Duration 106 ms    Q-T Interval 442 ms    QTc Calculation (Bazett) 426 ms    P Axis 28 degrees    R Axis 11 degrees    T Axis 38 degrees    Diagnosis       Sinus bradycardiaOtherwise normal ECGNo previous ECGs available       The Ekg interpreted by me shows  sinus bradycardia, rate=56  Axis is   Left axis deviation  QTc is  normal  Intervals and Durations are unremarkable. ST Segments: nonspecific changes  Bradycardia is new, however no other significant changes from previous performed 1/23/2021    RADIOLOGY  XR CHEST PORTABLE   Final Result   1. No acute abnormality. ED COURSE/MDM  Patient seen and evaluated. Old records reviewed. Labs and imaging reviewed and results discussed with patient. Patient is a 58-year-old female presenting with multiple complaints, tooth pain, sinus congestion and headache, 2 episodes of nonbloody emesis over the past 2 days. Full HPI as detailed above. Upon arrival in ED, vitals reassuring.   Patient resting comfortably. She appears extremely anxious. Physical exam as detailed above, she does have a broken lower molar without evidence of obvious surrounding abscess. She is having no throat swelling or difficulty swallowing. Her neurologic exam is nonfocal.  She is afebrile and appears nontoxic. Initially, plans are to start patient on antibiotics for potential dental infection. Did not originally order laboratory work-up, however throughout her course of stay in the ED she started to complain of palpitations. For this reason, basic cardiac work-up was obtained. EKG without any acute evidence of ischemia or arrhythmias. Chest x-ray without acute abnormalities. Troponin is negative. Was mildly hypokalemic and was given oral potassium repletion in the department. Patient was reassessed and continued to feel well. I feel that her symptoms are likely related to anxiety. She will be discharged on antibiotics and advised to follow-up with dentist for further evaluation of her dental issues. Patient was comfortable in agreement with plan of care and was discharged. Given return precautions. Advised PCP follow-up. During the patient's ED course, the patient was given:  Medications   ondansetron (ZOFRAN-ODT) disintegrating tablet 4 mg (4 mg Oral Given 3/7/21 0917)   acetaminophen (TYLENOL) tablet 650 mg (650 mg Oral Given 3/7/21 0917)   penicillin v potassium (VEETID) tablet 500 mg (500 mg Oral Given 3/7/21 0917)   potassium bicarb-citric acid (EFFER-K) effervescent tablet 40 mEq (40 mEq Oral Given 3/7/21 1106)        CLINICAL IMPRESSION  1. Sinus headache    2. Non-intractable vomiting with nausea, unspecified vomiting type    3. Pain, dental    4. Palpitations    5. Hypokalemia        Blood pressure 113/67, pulse 60, temperature 98.1 °F (36.7 °C), temperature source Oral, resp. rate 16, last menstrual period 03/07/2021, SpO2 97 %, not currently breastfeeding.     Royal Parker was discharged to home in good condition. Patient was given scripts for the following medications. I counseled patient how to take these medications. Discharge Medication List as of 3/7/2021 11:18 AM      START taking these medications    Details   penicillin v potassium (VEETID) 500 MG tablet Take 1 tablet by mouth 4 times daily for 7 days, Disp-28 tablet, R-0Normal      ondansetron (ZOFRAN ODT) 4 MG disintegrating tablet Take 1 tablet by mouth every 8 hours as needed for Nausea or Vomiting, Disp-9 tablet, R-0Normal             Follow-up with:  Dominga Harrell MD  [address] [place] [address]  [phone]    Schedule an appointment as soon as possible for a visit   As needed    [facility]  [address] [facility]  [phone]  Go to   If symptoms worsen    Dentist of your choice    Schedule an appointment as soon as possible for a visit         DISCLAIMER: This chart was created using Dragon dictation software. Efforts were made by me to ensure accuracy, however some errors may be present due to limitations of this technology and occasionally words are not transcribed correctly.        Kim Rainey MD  03/07/21 7682

## 2021-03-08 ENCOUNTER — E-VISIT (OUTPATIENT)
Dept: INTERNAL MEDICINE CLINIC | Age: 51
End: 2021-03-08
Payer: MEDICAID

## 2021-03-08 ENCOUNTER — CARE COORDINATION (OUTPATIENT)
Dept: CASE MANAGEMENT | Age: 51
End: 2021-03-08

## 2021-03-08 ENCOUNTER — TELEMEDICINE (OUTPATIENT)
Dept: INTERNAL MEDICINE CLINIC | Age: 51
End: 2021-03-08

## 2021-03-08 DIAGNOSIS — E87.6 HYPOKALEMIA: ICD-10-CM

## 2021-03-08 DIAGNOSIS — R94.31 EKG ABNORMALITIES: ICD-10-CM

## 2021-03-08 DIAGNOSIS — R51.9 SINUS HEADACHE: Primary | ICD-10-CM

## 2021-03-08 DIAGNOSIS — K08.89 TOOTH PAIN: ICD-10-CM

## 2021-03-08 LAB
EKG ATRIAL RATE: 56 BPM
EKG DIAGNOSIS: NORMAL
EKG P AXIS: 28 DEGREES
EKG P-R INTERVAL: 118 MS
EKG Q-T INTERVAL: 442 MS
EKG QRS DURATION: 106 MS
EKG QTC CALCULATION (BAZETT): 426 MS
EKG R AXIS: 11 DEGREES
EKG T AXIS: 38 DEGREES
EKG VENTRICULAR RATE: 56 BPM

## 2021-03-08 PROCEDURE — 93010 ELECTROCARDIOGRAM REPORT: CPT | Performed by: INTERNAL MEDICINE

## 2021-03-08 PROCEDURE — 3017F COLORECTAL CA SCREEN DOC REV: CPT | Performed by: INTERNAL MEDICINE

## 2021-03-08 PROCEDURE — 99213 OFFICE O/P EST LOW 20 MIN: CPT | Performed by: INTERNAL MEDICINE

## 2021-03-08 PROCEDURE — G8427 DOCREV CUR MEDS BY ELIG CLIN: HCPCS | Performed by: INTERNAL MEDICINE

## 2021-03-08 NOTE — PROGRESS NOTES
Date of Service:  3/8/2021    Chief Complaint:      Chief Complaint   Patient presents with   [facility] ED Follow-up     headache, dental pain, low potassium 03/07/2021       HPI:  Clara Duran is a 48 y.o. Pursuant to the emergency declaration under the [address] [place] [address], [facility] waiver authority and the [employer] Act, this Virtual  Video Visit was conducted, with patient's consent, to reduce the patient's risk of exposure to COVID-19 and provide continuity of care. Service is  provided through a video synchronous discussion virtually to substitute for in-person clinic visit with the patient being at home and Dr. Olga Lara being at home. She was in the ER for tooth pain and sinus pressure. An EKG was done because she complain of her heart racing and it showed bradycardia with some nonspecific ST changes. She denies any chest pain and ER physician wasn't worry about it. Her right arm is also bruise due to IV in place and labs drawn. Not much pain and doing ok.     Lab Results   Component Value Date    LABMICR YES 01/23/2021     Lab Results   Component Value Date     03/07/2021    K 3.3 (L) 03/07/2021    CL 98 (L) 03/07/2021    CO2 28 03/07/2021    BUN 7 03/07/2021    CREATININE 0.7 03/07/2021    GLUCOSE 155 (H) 03/07/2021    CALCIUM 10.2 03/07/2021     No results found for: CHOL, TRIG, HDL, LDLCALC, LDLDIRECT  Lab Results   Component Value Date    ALT 34 03/07/2021    AST 21 03/07/2021     No results found for: TSH, T4FREE  Lab Results   Component Value Date    WBC 8.6 03/07/2021    HGB 14.7 03/07/2021    HCT 44.2 03/07/2021    MCV 92.4 03/07/2021     03/07/2021     Lab Results   Component Value Date    INR 0.96 11/27/2015      No results found for: PSA   No results found for: [facility]     Patient Active Problem List   Diagnosis    Mixed stress and urge urinary incontinence    Chronic wrist pain, left       Allergies Allergen Reactions    Aspirin Swelling    Bee Venom Swelling    Lactose Intolerance (Gi)      Outpatient Medications Marked as Taking for the 3/8/21 encounter (Telemedicine) with Mor Pina Amaya MD   Medication Sig Dispense Refill    penicillin v potassium (VEETID) 500 MG tablet Take 1 tablet by mouth 4 times daily for 7 days 28 tablet 0    ondansetron (ZOFRAN ODT) 4 MG disintegrating tablet Take 1 tablet by mouth every 8 hours as needed for Nausea or Vomiting 9 tablet 0    loratadine (CLARITIN) 10 MG tablet Take 1 tablet by mouth daily 20 tablet 0    fluticasone (FLONASE) 50 MCG/ACT nasal spray 1 spray by Each Nostril route daily 1 Bottle 0    acetaminophen (TYLENOL) 325 MG tablet Take 650 mg by mouth every 6 hours as needed for Pain           Review of Systems: 14 systems were negative except of what was stated on HPI    Nursing note and vitals reviewed. There were no vitals filed for this visit. Wt Readings from Last 3 Encounters:   01/23/21 180 lb (81.6 kg)   01/20/21 180 lb (81.6 kg)   01/16/21 165 lb (74.8 kg)     BP Readings from Last 3 Encounters:   03/07/21 113/67   01/23/21 114/66   01/20/21 132/82     There is no height or weight on file to calculate BMI. Constitutional: Patient appears well-developed and well-nourished. No distress. Head: Normocephalic and atraumatic. Skin: No rash or erythema. Psychiatric: Normal mood and affect. Behavior is normal.       Assessment/Plan:  Robbie Johnson was seen today for ed follow-up. Diagnoses and all orders for this visit:    Sinus headache    Tooth pain    EKG abnormalities    Hypokalemia    replaced in the ER  Keep hydrated    Return if symptoms worsen or fail to improve.

## 2021-03-09 ENCOUNTER — CARE COORDINATION (OUTPATIENT)
Dept: CASE MANAGEMENT | Age: 51
End: 2021-03-09

## 2021-03-09 NOTE — CARE COORDINATION
Ambulatory Care Manager contacted the patient by telephone to perform post discharge assessment. Call within 2 business days of discharge: Yes. Verified name and  with patient as identifiers. Provided introduction to self, and explanation of the ACM role, and reason for call due to risk factors for infection to COVID-19. Patient contacted regarding Katelyn Ortiz. Discussed COVID-19 related testing which was not done at this time. Patient tested positive for COVID-19 on 2/10/2021, 2021, 2021 and then tested negative for COVID-19 on 3/3/2021  Patient verbalized awareness it is possible to get COVID-19 again. Discussed COVID-19 immunization and encouraged patient to further discuss with PCP    Symptoms reviewed with patient who verbalized the following symptoms: no new symptoms, no worsening symptoms and Headache and \"sinus problems\". Due to no new or worsening symptoms encounter was not routed to provider for escalation. Discussed follow-up appointments. If no appointment was previously scheduled, appointment scheduling offered: Patient reports she has already f/u with her PCP and she has an upcoming appointment with her dentist on 3/13/2021  [facility] follow up appointment(s):   Future Appointments   Date Time Provider Nikko Rivera   3/10/2021 10:55 AM SCHEDULE, [address] AND FLU [facility]   2021  9:50 AM Jamilah Amaya MD [address] follow up appointment(s):     Non-face-to-face services provided:  Obtained and reviewed discharge summary and/or continuity of care documents     Advance Care Planning:   Does patient have an Advance Directive:  reviewed and encouraged patient to utilize My Chart for advance directive forms. .   Confirmed emergency contact is up to date    Patient has following risk factors of: obesity. ACM reviewed discharge instructions with patient who verbalized understanding.    The patient agrees to contact the [employer] exposure line [phone], local [facility] department [address]: ([phone]) and PCP office for questions related to their healthcare. Encouraged patient to view the www.cdc.gov web site to re-enforce information reviewed and for COVID-19 updates. Reviewed and educated patient on any new and changed medications related to discharge diagnosis   Was patient discharged with a pulse oximeter? No      Patient given information for [employer] Loop and agrees to enroll yes  Patient's preferred e-mail: [email or web address]  Patient's preferred phone number: [phone]  Based on Loop alert triggers, patient will be contacted by nurse care manager for worsening symptoms. Pt will be further monitored by COVID Loop Team. From CDC: Are you at higher risk for severe illness?  Wash your hands often.  Avoid close contact (6 feet, which is about two arm lengths) with people who are sick.  Put distance between yourself and other people if COVID-19 is spreading in your community.  Clean and disinfect frequently touched surfaces.  Avoid all cruise travel and non-essential air travel.  Call your healthcare professional if you have concerns about COVID-19 and your underlying condition or if you are sick. For more information on steps you can take to protect yourself, see CDC's How to Protect Yourself    Pt will be further monitored by COVID Loop Team.

## 2021-03-10 ENCOUNTER — OFFICE VISIT (OUTPATIENT)
Dept: PRIMARY CARE CLINIC | Age: 51
End: 2021-03-10
Payer: MEDICAID

## 2021-03-10 DIAGNOSIS — Z11.59 SCREENING FOR VIRAL DISEASE: Primary | ICD-10-CM

## 2021-03-10 PROCEDURE — G8428 CUR MEDS NOT DOCUMENT: HCPCS | Performed by: NURSE PRACTITIONER

## 2021-03-10 PROCEDURE — 99211 OFF/OP EST MAY X REQ PHY/QHP: CPT | Performed by: NURSE PRACTITIONER

## 2021-03-10 PROCEDURE — G8417 CALC BMI ABV UP PARAM F/U: HCPCS | Performed by: NURSE PRACTITIONER

## 2021-03-10 NOTE — PATIENT INSTRUCTIONS

## 2021-03-11 LAB — SARS-COV-2: NOT DETECTED
